# Patient Record
Sex: FEMALE | Race: BLACK OR AFRICAN AMERICAN | NOT HISPANIC OR LATINO | Employment: UNEMPLOYED | URBAN - METROPOLITAN AREA
[De-identification: names, ages, dates, MRNs, and addresses within clinical notes are randomized per-mention and may not be internally consistent; named-entity substitution may affect disease eponyms.]

---

## 2017-09-06 ENCOUNTER — HOSPITAL ENCOUNTER (EMERGENCY)
Facility: HOSPITAL | Age: 42
Discharge: HOME | End: 2017-09-06
Attending: EMERGENCY MEDICINE
Payer: MEDICAID

## 2017-09-06 VITALS
SYSTOLIC BLOOD PRESSURE: 145 MMHG | TEMPERATURE: 98 F | HEIGHT: 64 IN | OXYGEN SATURATION: 99 % | DIASTOLIC BLOOD PRESSURE: 83 MMHG | WEIGHT: 254 LBS | HEART RATE: 83 BPM | RESPIRATION RATE: 17 BRPM | BODY MASS INDEX: 43.36 KG/M2

## 2017-09-06 DIAGNOSIS — J06.9 UPPER RESPIRATORY TRACT INFECTION, UNSPECIFIED TYPE: Primary | ICD-10-CM

## 2017-09-06 DIAGNOSIS — I10 ESSENTIAL HYPERTENSION: ICD-10-CM

## 2017-09-06 PROCEDURE — 99283 EMERGENCY DEPT VISIT LOW MDM: CPT

## 2017-09-06 RX ORDER — CLONIDINE HYDROCHLORIDE 0.1 MG/1
0.2 TABLET ORAL 3 TIMES DAILY
Qty: 180 TABLET | Refills: 0 | Status: SHIPPED | OUTPATIENT
Start: 2017-09-06 | End: 2018-12-21 | Stop reason: CLARIF

## 2017-09-06 RX ORDER — BENZONATATE 100 MG/1
100 CAPSULE ORAL 3 TIMES DAILY PRN
Qty: 20 CAPSULE | Refills: 0 | Status: SHIPPED | OUTPATIENT
Start: 2017-09-06 | End: 2017-09-16

## 2017-09-07 NOTE — ED NOTES
Dr. Hay aware of pt's vital signs & states OK for discharge home at this time. Pt is stable, in NAD, RR equal & unlabored. Pt denies any further needs, questions, concerns or complaints. Will d/c per MD order.

## 2017-09-07 NOTE — ED NOTES
Armband checked, patient verified. Verified by patient spelling and stating the correct name on armband along with correct date of birth.

## 2017-09-07 NOTE — DISCHARGE INSTRUCTIONS
Rest  Drink plenty of clear fluids   Nasal saline spray to clear nasal drainage and help with nasal congestion  Zyrtec or Claritin to help dry mucus and post nasal drip  Mucinex or Mucinex DM for cough and chest congestion  Tylenol or Ibuprofen for fever, headache and body aches  Warm salt water gargles for throat comfort  Chloraseptic spray or lozenges for throat comfort  RTC with no improvement or worsening    Regarding UPPER RESPIRATORY ILLNESS, for treatment, I encouraged patient to: drink plenty of fluids; get lots of rest; take medications as prescribed; use over-the-counter medications for symptomatic treatment;  and use a humidifier or steam in the bathroom to improve patency of upper airway.  Patient instructed to notify primary care provider, or return to the emergency department, if the they: have a cough most days or have a cough that returns frequently; begin coughing up blood; develop a high fever or shaking chills; have a low-grade fever for three or more days; develop thick, greenish mucus, especially if it has a bad smell; and experience shortness of breath or chest pain. For prevention, discussed with patient the importance of refraining from smoking (if applicable), getting annual influenza vaccines, reducing exposure to air pollution, and the need to frequently wash hands to avoid spread of infection.     Regarding HYPERTENSION, I advised patient to: keep a record of blood pressure results; take your blood pressure medication exactly as directed without skipping doses; avoid medications that contain heart stimulants, including over-the-counter drugs such as decongestants; maintain a healthy weight; cut back on sodium intake (i.e., limit canned, dried, packaged, and fast foods and don?t add salt to food); follow the DASH (Dietary Approaches to Stop Hypertension) eating plan which recommends vegetables, fruits, whole gains, and other heart healthy foods; begin an exercise program that includes   aerobic exercise 3 to 4 times a week for an average of 40 minutes at a time (with approval of cardiologist or primary care provider); limit drinks that contain alcohol and caffeine; control levels of emotional stress; and seek emergency care for any shortness of breath, chest pain, difficulty speaking, confusion, or visual changes.

## 2017-09-07 NOTE — ED PROVIDER NOTES
"Encounter Date: 9/6/2017       History     Chief Complaint   Patient presents with    Nasal Congestion     with a productive cough, and "sinus pressure" since X3 days. "I need another script for my blood pressure medicine."     The history is provided by the patient.   URI   The primary symptoms include sore throat and cough. Primary symptoms do not include fever, fatigue, headaches, ear pain, swollen glands, wheezing, abdominal pain, nausea, vomiting, myalgias, arthralgias or rash. The current episode started several days ago (about 3 days). This is a new problem. The problem has been gradually worsening.   The sore throat began more than 2 days ago. The sore throat has been gradually worsening since its onset. The sore throat is not accompanied by trouble swallowing, drooling, hoarse voice or stridor. Sore Throat Pain Scale: mild.   The cough began 3 to 5 days ago. The cough is non-productive. There is nondescript sputum produced. It is exacerbated by smoking.   The onset of the illness is associated with exposure to sick contacts (pt states her grandchild has similar symptoms and she was taking care of grandchind for the past several days.). Symptoms associated with the illness include congestion. The illness is not associated with chills, plugged ear sensation, facial pain, sinus pressure or rhinorrhea. The following treatments were addressed: Acetaminophen was effective. A decongestant was not tried. Aspirin was not tried. NSAIDs were not tried. Risk factors: pt states her CD4 ct is wnl and viral load is undetectable.  pt is taking HIV meds regularly and has not missed any doses.     Also, pt states she is almost out of her BP meds and is requesting a refill.  Pt has apt with PCP next week.    Review of patient's allergies indicates:   Allergen Reactions    Morphine Itching     Past Medical History:   Diagnosis Date    HIV (human immunodeficiency virus infection)     Hypertension      Past Surgical History: "   Procedure Laterality Date     SECTION      FRACTURE SURGERY      HYSTERECTOMY       History reviewed. No pertinent family history.  Social History   Substance Use Topics    Smoking status: Current Every Day Smoker     Packs/day: 0.50    Smokeless tobacco: Not on file    Alcohol use No     Review of Systems   Constitutional: Negative for chills, fatigue and fever.   HENT: Positive for congestion and sore throat. Negative for drooling, ear pain, hoarse voice, rhinorrhea, sinus pressure and trouble swallowing.    Respiratory: Positive for cough. Negative for shortness of breath, wheezing and stridor.    Cardiovascular: Negative for chest pain.   Gastrointestinal: Negative for abdominal pain, nausea and vomiting.   Genitourinary: Negative for dysuria.   Musculoskeletal: Negative for arthralgias, back pain and myalgias.   Skin: Negative for rash.   Neurological: Negative for weakness and headaches.   Hematological: Does not bruise/bleed easily.   All other systems reviewed and are negative.      Physical Exam     Initial Vitals [17 1905]   BP Pulse Resp Temp SpO2   (!) 145/83 83 17 98.2 °F (36.8 °C) 99 %      MAP       103.67         Physical Exam    Nursing note and vitals reviewed.  Constitutional: Vital signs are normal. She appears well-developed and well-nourished.   HENT:   Head: Normocephalic and atraumatic.   Right Ear: Hearing, tympanic membrane, external ear and ear canal normal.   Left Ear: Hearing, tympanic membrane and external ear normal.   Nose: Mucosal edema present. Right sinus exhibits no maxillary sinus tenderness and no frontal sinus tenderness. Left sinus exhibits no maxillary sinus tenderness and no frontal sinus tenderness.   Mouth/Throat: Uvula is midline, oropharynx is clear and moist and mucous membranes are normal. No oropharyngeal exudate, posterior oropharyngeal edema or posterior oropharyngeal erythema.   Eyes: Conjunctivae and EOM are normal. Pupils are equal, round,  "and reactive to light.   Neck: Normal range of motion and full passive range of motion without pain. Neck supple. No thyromegaly present.   Cardiovascular: Normal rate, regular rhythm, normal heart sounds and intact distal pulses. Exam reveals no gallop and no friction rub.    No murmur heard.  Pulmonary/Chest: Breath sounds normal. No respiratory distress. She has no wheezes. She has no rhonchi. She exhibits no tenderness.   Abdominal: Soft. Bowel sounds are normal. She exhibits no distension. There is no tenderness. There is no rebound and no guarding.   Musculoskeletal: Normal range of motion. She exhibits no edema or tenderness.   Lymphadenopathy:     She has no cervical adenopathy.   Neurological: She is alert and oriented to person, place, and time. She has normal strength. No cranial nerve deficit or sensory deficit.   Skin: Skin is warm and dry. No rash noted.   Psychiatric: She has a normal mood and affect. Her behavior is normal. Judgment and thought content normal.         ED Course   Procedures  Labs Reviewed - No data to display       Vitals:    09/06/17 1905   BP: (!) 145/83   Pulse: 83   Resp: 17   Temp: 98.2 °F (36.8 °C)   TempSrc: Oral   SpO2: 99%   Weight: 115.2 kg (254 lb)   Height: 5' 4" (1.626 m)       No results found for this or any previous visit.      Imaging Results    None         Medications - No data to display    8:42 PM - Re-evaluation: The patient is resting comfortably and is in no acute distress. She states that her symptoms have improved after treatment within ER. Discussed shared treatment plan, specific conditions for return, and importance of follow up with patient and family.  She understands and agrees with the plan as discussed. Answered  her questions at this time. She has remained hemodynamically stable throughout the ED course and is appropriate for discharge home.     Regarding UPPER RESPIRATORY ILLNESS, for treatment, I encouraged patient to: drink plenty of fluids; get " lots of rest; take medications as prescribed; use over-the-counter medications for symptomatic treatment;  and use a humidifier or steam in the bathroom to improve patency of upper airway.  Patient instructed to notify primary care provider, or return to the emergency department, if the they: have a cough most days or have a cough that returns frequently; begin coughing up blood; develop a high fever or shaking chills; have a low-grade fever for three or more days; develop thick, greenish mucus, especially if it has a bad smell; and experience shortness of breath or chest pain. For prevention, discussed with patient the importance of refraining from smoking (if applicable), getting annual influenza vaccines, reducing exposure to air pollution, and the need to frequently wash hands to avoid spread of infection.     Regarding HYPERTENSION, I advised patient to: keep a record of blood pressure results; take your blood pressure medication exactly as directed without skipping doses; avoid medications that contain heart stimulants, including over-the-counter drugs such as decongestants; maintain a healthy weight; cut back on sodium intake (i.e., limit canned, dried, packaged, and fast foods and dont add salt to food); follow the DASH (Dietary Approaches to Stop Hypertension) eating plan which recommends vegetables, fruits, whole gains, and other heart healthy foods; begin an exercise program that includes  aerobic exercise 3 to 4 times a week for an average of 40 minutes at a time (with approval of cardiologist or primary care provider); limit drinks that contain alcohol and caffeine; control levels of emotional stress; and seek emergency care for any shortness of breath, chest pain, difficulty speaking, confusion, or visual changes.       Pre-hypertension/Hypertension: The pt has been informed that they may have pre-hypertension or hypertension based on a blood pressure reading in the ED. I recommend that the pt call  the PCP listed on their discharge instructions or a physician of their choice this week to arrange f/u for further evaluation of possible pre-hypertension or hypertension.     Yojana Durán was given a handout which discussed their disease process, precautions, and instructions for follow-up and therapy.    Follow-up Information     [facility]. Schedule an appointment as soon as possible for a visit in 1 week.    Why:  or your physician to reevaluate blood pressure and uri.  Contact information:  [address] [facility] [address] [phone]             [facility]. Schedule an appointment as soon as possible for a visit in 1 week.    Contact information:  [address]  [place] [address] [phone]             [facility].    Specialty:  Emergency Medicine  Why:  As needed, If symptoms worsen  Contact information:  [address] [address] [address] [phone]                 Discharge Medication List as of 9/6/2017  8:37 PM      START taking these medications    Details   benzonatate (TESSALON) 100 MG capsule Take 1 capsule (100 mg total) by mouth 3 (three) times daily as needed for Cough., Starting Wed 9/6/2017, Until Sat 9/16/2017, Print                ED Diagnosis  1. Upper respiratory tract infection, unspecified type    2. Essential hypertension                             ED Course      Clinical Impression:   The primary encounter diagnosis was Upper respiratory tract infection, unspecified type. A diagnosis of Essential hypertension was also pertinent to this visit.    Disposition:   Disposition: Discharged  Condition: Stable                        Ben Hay Jr., MD  09/07/17 0134

## 2018-12-21 ENCOUNTER — HOSPITAL ENCOUNTER (EMERGENCY)
Facility: HOSPITAL | Age: 43
Discharge: HOME | End: 2018-12-21
Attending: EMERGENCY MEDICINE
Payer: MEDICAID

## 2018-12-21 VITALS
HEIGHT: 64 IN | SYSTOLIC BLOOD PRESSURE: 126 MMHG | DIASTOLIC BLOOD PRESSURE: 75 MMHG | WEIGHT: 251.56 LBS | TEMPERATURE: 98 F | OXYGEN SATURATION: 98 % | HEART RATE: 82 BPM | RESPIRATION RATE: 16 BRPM | BODY MASS INDEX: 42.95 KG/M2

## 2018-12-21 DIAGNOSIS — K08.89 PAIN, DENTAL: Primary | ICD-10-CM

## 2018-12-21 DIAGNOSIS — R05.9 COUGH: ICD-10-CM

## 2018-12-21 DIAGNOSIS — Z72.0 TOBACCO USE: ICD-10-CM

## 2018-12-21 PROCEDURE — 99284 EMERGENCY DEPT VISIT MOD MDM: CPT

## 2018-12-21 RX ORDER — LORATADINE 10 MG/1
10 TABLET ORAL DAILY
COMMUNITY
Start: 2018-09-21 | End: 2019-11-06

## 2018-12-21 RX ORDER — CLONIDINE HYDROCHLORIDE 0.2 MG/1
0.2 TABLET ORAL 2 TIMES DAILY
COMMUNITY
Start: 2017-07-10 | End: 2019-01-08 | Stop reason: SDUPTHER

## 2018-12-21 RX ORDER — DOXYCYCLINE 100 MG/1
100 CAPSULE ORAL 2 TIMES DAILY
Qty: 20 CAPSULE | Refills: 0 | Status: SHIPPED | OUTPATIENT
Start: 2018-12-21 | End: 2018-12-31

## 2018-12-21 RX ORDER — FLUTICASONE PROPIONATE 50 MCG
1 SPRAY, SUSPENSION (ML) NASAL DAILY
COMMUNITY
Start: 2018-09-21 | End: 2019-09-21

## 2018-12-21 RX ORDER — NAPROXEN 500 MG/1
500 TABLET ORAL 2 TIMES DAILY WITH MEALS
Qty: 12 TABLET | Refills: 0 | OUTPATIENT
Start: 2018-12-21 | End: 2019-11-06

## 2018-12-21 RX ORDER — ALBUTEROL SULFATE 90 UG/1
2 AEROSOL, METERED RESPIRATORY (INHALATION) EVERY 6 HOURS PRN
COMMUNITY
Start: 2018-02-16 | End: 2019-11-28 | Stop reason: SDUPTHER

## 2018-12-22 NOTE — ED PROVIDER NOTES
History      Chief Complaint   Patient presents with    Dental Pain     Onset two days ago. L side.        Review of patient's allergies indicates:   Allergen Reactions    Morphine Itching        HPI   HPI    2018, 6:58 PM   History obtained from the patient      History of Present Illness: Yojana Durán is a 43 y.o. female patient who presents to the Emergency Department for left dental pain for 2-3 days. Also cough for 2-3 weeks.  Daily smoker.  Denies f/n/v.  Symptoms are constant and moderate in severity. No further complaints or concerns at this time.           PCP: Primary Doctor No       Past Medical History:  Past Medical History:   Diagnosis Date    HIV (human immunodeficiency virus infection)     Hypertension          Past Surgical History:  Past Surgical History:   Procedure Laterality Date    ANKLE FRACTURE SURGERY Left      SECTION      FRACTURE SURGERY      HYSTERECTOMY             Family History:  History reviewed. No pertinent family history.        Social History:  Social History     Tobacco Use    Smoking status: Current Every Day Smoker     Packs/day: 0.50    Smokeless tobacco: Never Used   Substance and Sexual Activity    Alcohol use: No    Drug use: No    Sexual activity: Not on file       ROS   Constitutional: Negative for chills and fever.   HENT: Positive for dental problem. Negative for sore throat.    Respiratory: Negative for chest tightness and shortness of breath.    Cardiovascular: Negative for chest pain.   Gastrointestinal: Negative for nausea and vomiting.   Genitourinary: Negative for dysuria.   Musculoskeletal: Negative for back pain.   Skin: Negative for pallor and rash.   Neurological: Negative for weakness.   Hematological: Does not bruise/bleed easily.   All other systems reviewed and are negative.  Review of Systems    Physical Exam      Initial Vitals [18 1844]   BP Pulse Resp Temp SpO2   126/75 82 16 98.4 °F (36.9 °C) 98 %      MAP    "    --         Physical Exam  Vital signs and nursing notes reviewed.  Constitutional: Patient is in NAD. Awake and alert. Well-developed and well-nourished.  Head: Atraumatic. Normocephalic.  Eyes: PERRL. EOM intact. Conjunctivae nl. No scleral icterus.  ENT: Mucous membranes are moist. Oropharynx is clear.  Left lower gingival ttp, mild erythema.  +dental caries.  No facial edema  Neck: Supple. No JVD. No lymphadenopathy.  No meningismus  Cardiovascular: Regular rate and rhythm. No murmurs, rubs, or gallops. Distal pulses are 2+ and symmetric.  Pulmonary/Chest: No respiratory distress. Clear to auscultation bilaterally. No wheezing, rales, or rhonchi.  Abdominal: Soft. Non-distended. No TTP. No rebound, guarding, or rigidity. Good bowel sounds.  Genitourinary: No CVA tenderness  Musculoskeletal: Moves all extremities. No edema.   Skin: Warm and dry.  Neurological: Awake and alert. No acute focal neurological deficits are appreciated.  Psychiatric: Normal affect. Good eye contact. Appropriate in content.      ED Course      Procedures  ED Vital Signs:  Vitals:    12/21/18 1844   BP: 126/75   Pulse: 82   Resp: 16   Temp: 98.4 °F (36.9 °C)   TempSrc: Oral   SpO2: 98%   Weight: 114.1 kg (251 lb 8.7 oz)   Height: 5' 4" (1.626 m)                 Imaging Results:  Imaging Results    None            The Emergency Provider reviewed the vital signs and test results, which are outlined above.    ED Discussion       Will rx doxy to cover dental and smokers bronchitis.  Pt already has albuterol inhaler.      All findings were reviewed with the patient/family in detail.  Findings seem to be most consistent with a diagnosis of dental pain and dental caries.  GAVE PT LIST OF DENTAL RESOURCES.  DISCUSSED POTENTIAL COMPLICATIONS IF PT DOES NOT FOLLOW UP WITH DENTIST.  All remaining questions and concerns were addressed at that time.  Patient/family has been counseled regarding the need for follow-up as well as the indication to " return to the emergency room should new or worrisome developments occur.        Medication(s) given in the ER:  Medications - No data to display        Follow-up Information     [facility] [place] In 2 days.    Contact information:  [address] [facility]  [place] [id number] [phone]                      Medication List      START taking these medications    doxycycline 100 MG Cap  Commonly known as:  VIBRAMYCIN  Take 1 capsule (100 mg total) by mouth 2 (two) times daily. for 10 days     naproxen 500 MG tablet  Commonly known as:  NAPROSYN  Take 1 tablet (500 mg total) by mouth 2 (two) times daily with meals. Prn pain        ASK your doctor about these medications    albuterol 90 mcg/actuation inhaler  Commonly known as:  PROVENTIL/VENTOLIN HFA     CLARITIN 10 mg tablet  Generic drug:  loratadine     cloNIDine 0.2 MG tablet  Commonly known as:  CATAPRES     fluticasone 50 mcg/actuation nasal spray  Commonly known as:  FLONASE           Where to Get Your Medications      You can get these medications from any pharmacy    Bring a paper prescription for each of these medications  · doxycycline 100 MG Cap  · naproxen 500 MG tablet           This SmartLink is deprecated. Use AVBebestoreEDLIST instead to display the medication list for a patient.       Medical Decision Making              MDM               Clinical Impression:        ICD-10-CM ICD-9-CM   1. Pain, dental K08.89 525.9   2. Cough R05 786.2   3. Tobacco use Z72.0 305.1              Disposition  Stable  Discharged     Jenise Karimi PA-C  12/21/18 1954

## 2019-01-08 ENCOUNTER — HOSPITAL ENCOUNTER (EMERGENCY)
Facility: HOSPITAL | Age: 44
Discharge: HOME | End: 2019-01-08
Attending: EMERGENCY MEDICINE
Payer: MEDICAID

## 2019-01-08 VITALS
OXYGEN SATURATION: 99 % | TEMPERATURE: 99 F | RESPIRATION RATE: 18 BRPM | HEART RATE: 70 BPM | WEIGHT: 258.38 LBS | SYSTOLIC BLOOD PRESSURE: 145 MMHG | HEIGHT: 64 IN | BODY MASS INDEX: 44.11 KG/M2 | DIASTOLIC BLOOD PRESSURE: 89 MMHG

## 2019-01-08 DIAGNOSIS — B20 HIV (HUMAN IMMUNODEFICIENCY VIRUS INFECTION): ICD-10-CM

## 2019-01-08 DIAGNOSIS — J01.10 ACUTE FRONTAL SINUSITIS, RECURRENCE NOT SPECIFIED: Primary | ICD-10-CM

## 2019-01-08 PROCEDURE — 99284 EMERGENCY DEPT VISIT MOD MDM: CPT | Mod: ER

## 2019-01-08 RX ORDER — CLONIDINE HYDROCHLORIDE 0.2 MG/1
0.2 TABLET ORAL 2 TIMES DAILY
Qty: 60 TABLET | Refills: 0 | Status: SHIPPED | OUTPATIENT
Start: 2019-01-08 | End: 2019-11-06 | Stop reason: SDUPTHER

## 2019-01-08 RX ORDER — LAMIVUDINE 100 MG/1
100 TABLET, FILM COATED ORAL
COMMUNITY
Start: 2018-12-30 | End: 2019-01-13

## 2019-01-08 RX ORDER — PROMETHAZINE HYDROCHLORIDE 6.25 MG/5ML
6.25 SYRUP ORAL EVERY 6 HOURS PRN
Qty: 100 ML | Refills: 0 | Status: SHIPPED | OUTPATIENT
Start: 2019-01-08 | End: 2019-11-06

## 2019-01-08 RX ORDER — AMOXICILLIN AND CLAVULANATE POTASSIUM 875; 125 MG/1; MG/1
1 TABLET, FILM COATED ORAL 2 TIMES DAILY
Qty: 14 TABLET | Refills: 0 | Status: SHIPPED | OUTPATIENT
Start: 2019-01-08 | End: 2019-11-06

## 2019-01-11 NOTE — ED PROVIDER NOTES
Encounter Date: 2019       History     Chief Complaint   Patient presents with    Sinusitis     forehead pressure, nasal drip, onset 2 weeks     Patient currently presents with a chief complaint of sinus congestion.  Symptoms began about 14 days ago and has continued to progress despite use of antihistamines and there are now associated signs of sinus pressure in the forehead.  Patient denies ongoing fever.  PMH notable for a history of HIV.  Patient reports compliance with antiviral therapy.          Review of patient's allergies indicates:   Allergen Reactions    Morphine Itching     Past Medical History:   Diagnosis Date    HIV (human immunodeficiency virus infection)     Hypertension      Past Surgical History:   Procedure Laterality Date    ANKLE FRACTURE SURGERY Left      SECTION      FRACTURE SURGERY      HYSTERECTOMY       History reviewed. No pertinent family history.  Social History     Tobacco Use    Smoking status: Current Every Day Smoker     Packs/day: 0.50    Smokeless tobacco: Never Used   Substance Use Topics    Alcohol use: No    Drug use: No     Review of Systems   Constitutional: Negative for chills and fever.   HENT: Positive for congestion, postnasal drip, rhinorrhea and sinus pressure.    Respiratory: Negative for cough, chest tightness, shortness of breath and wheezing.    Cardiovascular: Negative for chest pain, palpitations and leg swelling.   Gastrointestinal: Negative for abdominal pain, constipation, diarrhea, nausea and vomiting.   Genitourinary: Negative for dysuria, frequency, urgency, vaginal bleeding and vaginal discharge.   Skin: Negative for color change and rash.   Allergic/Immunologic: Negative for immunocompromised state.   Neurological: Negative for dizziness, weakness and numbness.   Hematological: Negative for adenopathy. Does not bruise/bleed easily.   All other systems reviewed and are negative.      Physical Exam     Initial Vitals [19 1758]    BP Pulse Resp Temp SpO2   (!) 150/92 69 16 99 °F (37.2 °C) 99 %      MAP       --         Physical Exam    Nursing note and vitals reviewed.  Constitutional: She appears well-developed and well-nourished. She is not diaphoretic. No distress.   HENT:   Head: Normocephalic and atraumatic.   Right Ear: External ear normal.   Left Ear: External ear normal.   Nose: Mucosal edema and rhinorrhea present. Right sinus exhibits frontal sinus tenderness. Left sinus exhibits frontal sinus tenderness.   Mouth/Throat: Oropharynx is clear and moist.   Eyes: Conjunctivae and EOM are normal. Pupils are equal, round, and reactive to light. No scleral icterus.   Neck: Neck supple. No tracheal deviation present. No JVD present.   Cardiovascular: Normal rate, regular rhythm, normal heart sounds and intact distal pulses. Exam reveals no gallop and no friction rub.    No murmur heard.  Pulmonary/Chest: Breath sounds normal. No respiratory distress. She has no wheezes. She has no rhonchi. She has no rales.   Abdominal: Soft. Bowel sounds are normal. She exhibits no distension. There is no tenderness.   Musculoskeletal: Normal range of motion. She exhibits no edema.   Neurological: She is alert and oriented to person, place, and time. She has normal strength. No cranial nerve deficit.   Skin: Skin is warm and dry. No rash noted.   Psychiatric: She has a normal mood and affect. Her behavior is normal.         ED Course   Procedures  Labs Reviewed - No data to display       Imaging Results    None          Medical Decision Making:   ED Management:  All findings were reviewed with the patient/family in detail along with the diagnosis of acute sinusitis.  We will initiate ABX given the progression and extended time frame especially given the history of HIV.  I see no indication of an emergent process beyond that addressed during our encounter but have duly counseled the patient/family regarding the need for prompt follow-up as well as the  indications that should prompt immediate return to the emergency room should new or worrisome developments occur.  The patient/family communicates understanding of all this information and all remaining questions and concerns were addressed at this time.                          Clinical Impression:   The primary encounter diagnosis was Acute frontal sinusitis, recurrence not specified. A diagnosis of HIV (human immunodeficiency virus infection) was also pertinent to this visit.                             Pierre Tovar MD  01/10/19 9680

## 2019-11-06 ENCOUNTER — HOSPITAL ENCOUNTER (EMERGENCY)
Facility: HOSPITAL | Age: 44
Discharge: HOME | End: 2019-11-06
Attending: EMERGENCY MEDICINE
Payer: MEDICAID

## 2019-11-06 VITALS
DIASTOLIC BLOOD PRESSURE: 105 MMHG | OXYGEN SATURATION: 100 % | HEART RATE: 72 BPM | SYSTOLIC BLOOD PRESSURE: 185 MMHG | HEIGHT: 64 IN | RESPIRATION RATE: 20 BRPM | TEMPERATURE: 98 F | WEIGHT: 261.25 LBS | BODY MASS INDEX: 44.6 KG/M2

## 2019-11-06 DIAGNOSIS — R51.9 NONINTRACTABLE HEADACHE, UNSPECIFIED CHRONICITY PATTERN, UNSPECIFIED HEADACHE TYPE: ICD-10-CM

## 2019-11-06 DIAGNOSIS — J06.9 VIRAL URI WITH COUGH: Primary | ICD-10-CM

## 2019-11-06 DIAGNOSIS — I10 HYPERTENSION, UNSPECIFIED TYPE: ICD-10-CM

## 2019-11-06 LAB — POCT GLUCOSE: 73 MG/DL (ref 70–110)

## 2019-11-06 PROCEDURE — 99283 EMERGENCY DEPT VISIT LOW MDM: CPT | Mod: ER

## 2019-11-06 PROCEDURE — 25000003 PHARM REV CODE 250: Mod: ER | Performed by: PHYSICIAN ASSISTANT

## 2019-11-06 RX ORDER — ERGOCALCIFEROL 1.25 MG/1
50000 CAPSULE ORAL
COMMUNITY

## 2019-11-06 RX ORDER — ACETAMINOPHEN 500 MG
TABLET ORAL
COMMUNITY

## 2019-11-06 RX ORDER — PEN NEEDLE, DIABETIC 30 GX3/16"
NEEDLE, DISPOSABLE MISCELLANEOUS
COMMUNITY
Start: 2019-07-07

## 2019-11-06 RX ORDER — LISINOPRIL AND HYDROCHLOROTHIAZIDE 20; 25 MG/1; MG/1
TABLET ORAL
COMMUNITY

## 2019-11-06 RX ORDER — CLONIDINE HYDROCHLORIDE 0.2 MG/1
0.2 TABLET ORAL 2 TIMES DAILY
Qty: 60 TABLET | Refills: 0 | Status: SHIPPED | OUTPATIENT
Start: 2019-11-06 | End: 2019-11-28 | Stop reason: SDUPTHER

## 2019-11-06 RX ORDER — GLIMEPIRIDE 1 MG/1
TABLET ORAL
COMMUNITY
Start: 2019-07-22

## 2019-11-06 RX ORDER — INSULIN GLARGINE 100 [IU]/ML
40 INJECTION, SOLUTION SUBCUTANEOUS NIGHTLY
COMMUNITY

## 2019-11-06 RX ORDER — LANCETS 33 GAUGE
EACH MISCELLANEOUS
COMMUNITY

## 2019-11-06 RX ORDER — BUTALBITAL, ACETAMINOPHEN AND CAFFEINE 50; 325; 40 MG/1; MG/1; MG/1
2 TABLET ORAL
Status: COMPLETED | OUTPATIENT
Start: 2019-11-06 | End: 2019-11-06

## 2019-11-06 RX ORDER — FLUTICASONE PROPIONATE 50 MCG
1 SPRAY, SUSPENSION (ML) NASAL
COMMUNITY
Start: 2018-12-30 | End: 2019-11-28 | Stop reason: SDUPTHER

## 2019-11-06 RX ORDER — OXYMETAZOLINE HCL 0.05 %
SPRAY, NON-AEROSOL (ML) NASAL
COMMUNITY

## 2019-11-06 RX ORDER — CETIRIZINE HYDROCHLORIDE 10 MG/1
TABLET ORAL
COMMUNITY
End: 2022-02-12 | Stop reason: SDUPTHER

## 2019-11-06 RX ORDER — CLONIDINE HYDROCHLORIDE 0.2 MG/1
0.2 TABLET ORAL
Status: COMPLETED | OUTPATIENT
Start: 2019-11-06 | End: 2019-11-06

## 2019-11-06 RX ADMIN — BUTALBITAL, ACETAMINOPHEN AND CAFFEINE 2 TABLET: 50; 325; 40 TABLET ORAL at 06:11

## 2019-11-06 RX ADMIN — CLONIDINE HYDROCHLORIDE 0.2 MG: 0.2 TABLET ORAL at 06:11

## 2019-11-07 NOTE — ED PROVIDER NOTES
History      Chief Complaint   Patient presents with    Cough     cough for 1 week greenish to clear secretions with headache       Review of patient's allergies indicates:   Allergen Reactions    Morphine Itching        HPI   HPI    2019, 8:15 PM   History obtained from the patient      History of Present Illness: Yojana Durán is a 43 y.o. female patient who presents to the Emergency Department for headache for 2 days.  Also nasal congestion Onset was gradual.  Denies injury, fever, neck stiffness, vision change. Symptoms are moderate in severity.     No further complaints or concerns at this time.     Blood pressure is elevated.  Pt denies cp, sob, dizziness, vision change.  She ran out of clonidine 3 days ago        PCP: Primary Doctor No       Past Medical History:  Past Medical History:   Diagnosis Date    HIV (human immunodeficiency virus infection)     Hypertension          Past Surgical History:  Past Surgical History:   Procedure Laterality Date    ANKLE FRACTURE SURGERY Left      SECTION      FRACTURE SURGERY      HYSTERECTOMY             Family History:  History reviewed. No pertinent family history.        Social History:  Social History     Tobacco Use    Smoking status: Current Every Day Smoker     Packs/day: 0.50    Smokeless tobacco: Never Used   Substance and Sexual Activity    Alcohol use: No    Drug use: No    Sexual activity: Not on file       ROS   Review of Systems   Constitutional: Negative for chills and fever.   HENT: Negative for ear pain, facial swelling and sore throat.    Eyes: Negative for pain and visual disturbance.   Respiratory: Negative for shortness of breath.    Cardiovascular: Negative for chest pain.   Gastrointestinal: Negative for abdominal pain.   Genitourinary: Negative for dysuria.   Musculoskeletal: Negative for back pain and neck stiffness.   Skin: Negative for rash and wound.   Neurological: Positive for headaches. Negative for  "seizures, syncope, facial asymmetry, speech difficulty, weakness, light-headedness and numbness.   Hematological: Does not bruise/bleed easily.   All other systems reviewed and are negative.    Review of Systems    Physical Exam      Initial Vitals [11/06/19 1751]   BP Pulse Resp Temp SpO2   (!) 185/105 72 20 98.1 °F (36.7 °C) 100 %      MAP       --         Physical Exam  Vital signs and nursing notes reviewed.  Constitutional: Patient is in NAD. Awake and alert. Well-developed and well-nourished.  Head: Atraumatic. Normocephalic.  Eyes: PERRL. EOM intact. Conjunctivae nl. No scleral icterus.  ENT: Mucous membranes are moist. Oropharynx is clear.  TM's normal bilaterally.  No mastoid tenderness  Neck: Supple. No JVD. No lymphadenopathy.  No meningismus  Cardiovascular: Regular rate and rhythm. No murmurs, rubs, or gallops. Distal pulses are 2+ and symmetric.  Pulmonary/Chest: No respiratory distress. Clear to auscultation bilaterally. No wheezing, rales, or rhonchi.  Abdominal: Soft. Non-distended. No TTP. No rebound, guarding, or rigidity. Good bowel sounds.  Genitourinary: No CVA tenderness  Musculoskeletal: Moves all extremities. No edema.   Skin: Warm and dry.  Neurological: Awake and alert. No acute focal neurological deficits are appreciated.  No facial droop.  Tongue is midline.  No pronator drift.  Finger to nose normal.  Hand  equal and strong, 5/5 motor strength x 4.    Psychiatric: Normal affect. Good eye contact. Appropriate in content.      ED Course          Procedures  ED Vital Signs:  Vitals:    11/06/19 1751   BP: (!) 185/105   Pulse: 72   Resp: 20   Temp: 98.1 °F (36.7 °C)   TempSrc: Oral   SpO2: 100%   Weight: 118.5 kg (261 lb 3.9 oz)   Height: 5' 4" (1.626 m)         Results for orders placed or performed during the hospital encounter of 11/06/19   POCT glucose   Result Value Ref Range    POCT Glucose 73 70 - 110 mg/dL             Imaging Results:  Imaging Results    None            The " Emergency Provider reviewed the vital signs and test results, which are outlined above.    ED Discussion             Medication(s) given in the ER:  Medications   cloNIDine tablet 0.2 mg (0.2 mg Oral Given 11/6/19 1839)   butalbital-acetaminophen-caffeine -40 mg per tablet 2 tablet (2 tablets Oral Given 11/6/19 1840)           Follow-up Information     [facility].    Specialty:  Emergency Medicine  Why:  If symptoms worsen  Contact information:  [address]  [place] [address] [phone]           Care [facility] [place] In 2 days.    Contact information:  [address] [address] [address]  [place] [address] [phone]                          Medication List      CONTINUE taking these medications    cloNIDine 0.2 MG tablet  Commonly known as:  CATAPRES  Take 1 tablet (0.2 mg total) by mouth 2 (two) times daily.        STOP taking these medications    amoxicillin-clavulanate 875-125mg 875-125 mg per tablet  Commonly known as:  AUGMENTIN     naproxen 500 MG tablet  Commonly known as:  NAPROSYN     promethazine 6.25 mg/5 mL syrup  Commonly known as:  PHENERGAN        ASK your doctor about these medications    acetaminophen 500 MG tablet  Commonly known as:  TYLENOL     albuterol 90 mcg/actuation inhaler  Commonly known as:  PROVENTIL/VENTOLIN HFA     BIKTARVY -25 mg per tablet  Generic drug:  fshnkkhkr-hhfgiaqu-xmesvae ala     blood sugar diagnostic Strp     cetirizine 10 MG tablet  Commonly known as:  ZYRTEC     CLARITIN 10 mg tablet  Generic drug:  loratadine     fluticasone propionate 50 mcg/actuation nasal spray  Commonly known as:  FLONASE     glimepiride 1 MG tablet  Commonly known as:  AMARYL     LANTUS SOLOSTAR U-100 INSULIN glargine 100 units/mL (3mL) SubQ pen  Generic drug:  insulin     lisinopril-hydrochlorothiazide 20-25 mg Tab  Commonly known as:  PRINZIDE,ZESTORETIC     NASAL SPRAY (OXYMETAZOLINE) 0.05 % nasal spray  Generic drug:  oxymetazoline     ONETOUCH  "DELICA LANCETS 33 gauge Misc  Generic drug:  lancets     ONETOUCH VERIO SYSTEM MISC     pen needle, diabetic 31 gauge x 3/16" Ndle     VITAMIN D2 50,000 unit Cap  Generic drug:  ergocalciferol           Where to Get Your Medications      You can get these medications from any pharmacy    Bring a paper prescription for each of these medications  · cloNIDine 0.2 MG tablet             Medical Decision Making        All findings were reviewed with the patient/family in detail.   All remaining questions and concerns were addressed at that time.  Patient/family has been counseled regarding the need for follow-up as well as the indication to return to the emergency room should new or worrisome developments occur.        MDM               Clinical Impression:        ICD-10-CM ICD-9-CM   1. Viral URI with cough J06.9 465.9    B97.89    2. Nonintractable headache, unspecified chronicity pattern, unspecified headache type R51 784.0   3. Hypertension, unspecified type I10 401.9               Jenise Karimi PA-C  11/06/19 2039    "

## 2019-11-07 NOTE — ED NOTES
Aaox3, skin warm and dry, resp unlabored and even. amb with steady gait and fitzpatrick well. C/o cough for 1 week with headache.

## 2019-11-28 ENCOUNTER — HOSPITAL ENCOUNTER (EMERGENCY)
Facility: HOSPITAL | Age: 44
Discharge: HOME | End: 2019-11-28
Attending: EMERGENCY MEDICINE
Payer: MEDICAID

## 2019-11-28 VITALS
SYSTOLIC BLOOD PRESSURE: 128 MMHG | BODY MASS INDEX: 45.11 KG/M2 | TEMPERATURE: 98 F | DIASTOLIC BLOOD PRESSURE: 78 MMHG | RESPIRATION RATE: 20 BRPM | WEIGHT: 262.81 LBS | HEART RATE: 86 BPM | OXYGEN SATURATION: 98 %

## 2019-11-28 DIAGNOSIS — J02.9 SORE THROAT: ICD-10-CM

## 2019-11-28 DIAGNOSIS — Z76.0 MEDICATION REFILL: ICD-10-CM

## 2019-11-28 DIAGNOSIS — J06.9 VIRAL URI WITH COUGH: Primary | ICD-10-CM

## 2019-11-28 DIAGNOSIS — H92.01 EAR PAIN, RIGHT: ICD-10-CM

## 2019-11-28 PROCEDURE — 99284 EMERGENCY DEPT VISIT MOD MDM: CPT | Mod: ER

## 2019-11-28 RX ORDER — FLUTICASONE PROPIONATE 50 MCG
1 SPRAY, SUSPENSION (ML) NASAL DAILY
Qty: 1 BOTTLE | Refills: 0 | Status: SHIPPED | OUTPATIENT
Start: 2019-11-28 | End: 2020-11-27

## 2019-11-28 RX ORDER — BENZONATATE 100 MG/1
100 CAPSULE ORAL 3 TIMES DAILY PRN
Qty: 20 CAPSULE | Refills: 0 | Status: SHIPPED | OUTPATIENT
Start: 2019-11-28 | End: 2019-12-08

## 2019-11-28 RX ORDER — CLONIDINE HYDROCHLORIDE 0.2 MG/1
0.2 TABLET ORAL 2 TIMES DAILY
Qty: 60 TABLET | Refills: 0 | Status: SHIPPED | OUTPATIENT
Start: 2019-11-28 | End: 2020-10-13 | Stop reason: SDUPTHER

## 2019-11-28 RX ORDER — ALBUTEROL SULFATE 90 UG/1
1-2 AEROSOL, METERED RESPIRATORY (INHALATION) EVERY 6 HOURS PRN
Qty: 18 G | Refills: 0 | Status: SHIPPED | OUTPATIENT
Start: 2019-11-28

## 2020-10-13 ENCOUNTER — HOSPITAL ENCOUNTER (EMERGENCY)
Facility: HOSPITAL | Age: 45
Discharge: HOME | End: 2020-10-13
Attending: EMERGENCY MEDICINE
Payer: MEDICAID

## 2020-10-13 VITALS
HEART RATE: 101 BPM | SYSTOLIC BLOOD PRESSURE: 192 MMHG | RESPIRATION RATE: 16 BRPM | WEIGHT: 257.94 LBS | DIASTOLIC BLOOD PRESSURE: 86 MMHG | HEIGHT: 64 IN | OXYGEN SATURATION: 100 % | TEMPERATURE: 99 F | BODY MASS INDEX: 44.04 KG/M2

## 2020-10-13 DIAGNOSIS — R51.9 NONINTRACTABLE HEADACHE, UNSPECIFIED CHRONICITY PATTERN, UNSPECIFIED HEADACHE TYPE: Primary | ICD-10-CM

## 2020-10-13 DIAGNOSIS — Z91.148 NONCOMPLIANCE WITH MEDICATION REGIMEN: ICD-10-CM

## 2020-10-13 DIAGNOSIS — I10 HYPERTENSION, UNSPECIFIED TYPE: ICD-10-CM

## 2020-10-13 LAB
BILIRUB UR QL STRIP: NEGATIVE
CLARITY UR REFRACT.AUTO: CLEAR
COLOR UR AUTO: YELLOW
GLUCOSE UR QL STRIP: NEGATIVE
HGB UR QL STRIP: NEGATIVE
KETONES UR QL STRIP: NEGATIVE
LEUKOCYTE ESTERASE UR QL STRIP: NEGATIVE
NITRITE UR QL STRIP: NEGATIVE
PH UR STRIP: 6 [PH] (ref 5–8)
PROT UR QL STRIP: NEGATIVE
SP GR UR STRIP: >=1.03 (ref 1–1.03)
URN SPEC COLLECT METH UR: ABNORMAL
UROBILINOGEN UR STRIP-ACNC: NEGATIVE EU/DL

## 2020-10-13 PROCEDURE — 99284 EMERGENCY DEPT VISIT MOD MDM: CPT | Mod: ER

## 2020-10-13 PROCEDURE — 81003 URINALYSIS AUTO W/O SCOPE: CPT | Mod: ER

## 2020-10-13 PROCEDURE — 86803 HEPATITIS C AB TEST: CPT

## 2020-10-13 PROCEDURE — 25000003 PHARM REV CODE 250: Mod: ER | Performed by: EMERGENCY MEDICINE

## 2020-10-13 RX ORDER — ACETAMINOPHEN 500 MG
1000 TABLET ORAL
Status: COMPLETED | OUTPATIENT
Start: 2020-10-13 | End: 2020-10-13

## 2020-10-13 RX ORDER — NAPROXEN 500 MG/1
500 TABLET ORAL 2 TIMES DAILY WITH MEALS
Qty: 20 TABLET | Refills: 0 | Status: SHIPPED | OUTPATIENT
Start: 2020-10-13

## 2020-10-13 RX ORDER — CLONIDINE HYDROCHLORIDE 0.2 MG/1
0.2 TABLET ORAL 2 TIMES DAILY
Qty: 60 TABLET | Refills: 0 | OUTPATIENT
Start: 2020-10-13 | End: 2021-05-30

## 2020-10-13 RX ORDER — CLONIDINE HYDROCHLORIDE 0.2 MG/1
0.2 TABLET ORAL
Status: COMPLETED | OUTPATIENT
Start: 2020-10-13 | End: 2020-10-13

## 2020-10-13 RX ADMIN — ACETAMINOPHEN 1000 MG: 500 TABLET ORAL at 04:10

## 2020-10-13 RX ADMIN — CLONIDINE HYDROCHLORIDE 0.2 MG: 0.2 TABLET ORAL at 04:10

## 2020-10-13 NOTE — DISCHARGE INSTRUCTIONS
Patient's headache is consistent with previous headaches and lacks features concerning for emergent or life threatening condition.  I do not suspect SAH, meningitis, increased IC pressure, infectious, toxic, vascular, CNS, or other EMC.  I have discussed this at length with patient.  Regarding treatment, advised patient to take nonsteroidal antiinflammatory medications, acetaminophen, or any medications prescribed as instructed.  To prevent headaches, patient advised to avoid muscle tension (do not stay in one position for long periods of time), avoid eye strain (make sure there is adequate lighting for reading and routine tasks), eat healthy foods, exercise, and do not smoke or drink excessive alcohol.  Patient also advised to avoid overuse of over-the-counter or prescription medications. Patient was instructed to contact primary healthcare provider if: headaches continue to get worse; occur often enough that they affect daily work or normal activities; frequent medication use is needed to manage headaches; headaches that worsen and cause vomiting; or there are any questions or concerns about the condition or care. Advised patient to return to the emergency department or call 911 if they develop a sudden headache that presents suddenly and much worse than usual headaches; have difficulty seeing, speaking, or moving; become confused or have seizure activity; or develop a fever and stiff neck with the headache.     Regarding HYPERTENSION, I advised patient to: keep a record of blood pressure results; take your blood pressure medication exactly as directed without skipping doses; avoid medications that contain heart stimulants, including over-the-counter drugs such as decongestants; maintain a healthy weight; cut back on sodium intake (i.e., limit canned, dried, packaged, and fast foods and dont add salt to food); follow the DASH (Dietary Approaches to Stop Hypertension) eating plan which recommends vegetables,  fruits, whole gains, and other heart healthy foods; begin an exercise program that includes  aerobic exercise 3 to 4 times a week for an average of 40 minutes at a time (with approval of cardiologist or primary care provider); limit drinks that contain alcohol and caffeine; control levels of emotional stress; and seek emergency care for any shortness of breath, chest pain, difficulty speaking, confusion, or visual changes.

## 2020-10-13 NOTE — ED PROVIDER NOTES
Encounter Date: 10/13/2020       History     Chief Complaint   Patient presents with    Headache     Headache x 3 days. PT out of clonidine x 3 days.     The history is provided by the patient.   Headache   This is a recurrent problem. The current episode started in the past 7 days (x 3 days. pt states she gets these headaches when she doesn't take her htn meds. states she has been out for 3 days). The problem occurs intermittently. The problem has been unchanged. The pain is located in the frontal region. The pain quality is similar to prior headaches. The quality of the pain is described as aching. Pain scale: mild. Associated symptoms include phonophobia and photophobia. Pertinent negatives include no abdominal pain, abnormal behavior, anorexia, back pain, blurred vision, coughing, dizziness, drainage, ear pain, eye pain, eye redness, eye watering, facial sweating, fever, hearing loss, insomnia, loss of balance, muscle aches, nausea, neck pain, numbness, rhinorrhea, scalp tenderness, seizures, sinus pressure, sore throat, swollen glands, tingling, tinnitus, visual change, vomiting, weakness or weight loss. Nothing aggravates the symptoms. She has tried acetaminophen (6 hours ago) for the symptoms. The treatment provided mild relief. Her past medical history is significant for hypertension and obesity. There is no history of cancer, cluster headaches, immunosuppression, migraine headaches, migraines in the family, pseudotumor cerebri, recent head traumas, sinus disease or TMJ.     Pt states she has been out of her bp meds for several days/weeks.    Review of patient's allergies indicates:   Allergen Reactions    Morphine Itching     Past Medical History:   Diagnosis Date    HIV (human immunodeficiency virus infection)     Hypertension      Past Surgical History:   Procedure Laterality Date    ANKLE FRACTURE SURGERY Left      SECTION      FRACTURE SURGERY      HYSTERECTOMY       History reviewed. No  pertinent family history.  Social History     Tobacco Use    Smoking status: Current Every Day Smoker     Packs/day: 0.50    Smokeless tobacco: Never Used   Substance Use Topics    Alcohol use: No    Drug use: No     Review of Systems   Constitutional: Negative for fever and weight loss.   HENT: Negative for ear pain, hearing loss, rhinorrhea, sinus pressure, sore throat and tinnitus.    Eyes: Positive for photophobia. Negative for blurred vision, pain and redness.   Respiratory: Negative for cough and shortness of breath.    Cardiovascular: Negative for chest pain.   Gastrointestinal: Negative for abdominal pain, anorexia, nausea and vomiting.   Genitourinary: Negative for dysuria.   Musculoskeletal: Negative for back pain and neck pain.   Skin: Negative for rash.   Neurological: Positive for headaches. Negative for dizziness, tingling, seizures, weakness, numbness and loss of balance.   Hematological: Does not bruise/bleed easily.   Psychiatric/Behavioral: The patient does not have insomnia.    All other systems reviewed and are negative.      Physical Exam     Initial Vitals [10/13/20 1620]   BP Pulse Resp Temp SpO2   (!) 178/115 101 20 98.7 °F (37.1 °C) 97 %      MAP       --         Physical Exam    Nursing note and vitals reviewed.  Constitutional: She appears well-developed and well-nourished.   HENT:   Head: Normocephalic and atraumatic.       Right Ear: Hearing, tympanic membrane, external ear and ear canal normal.   Left Ear: Hearing, tympanic membrane, external ear and ear canal normal.   Nose: Nose normal.   Mouth/Throat: Uvula is midline, oropharynx is clear and moist and mucous membranes are normal. No oropharyngeal exudate.   Eyes: Conjunctivae and EOM are normal. Pupils are equal, round, and reactive to light.   Neck: Trachea normal, normal range of motion, full passive range of motion without pain and phonation normal. Neck supple. No thyromegaly present. No spinous process tenderness and no  muscular tenderness present. Normal range of motion present. No neck rigidity. No Brudzinski's sign and no Kernig's sign noted. Carotid bruit is not present.   Cardiovascular: Normal rate, regular rhythm, normal heart sounds and intact distal pulses. Exam reveals no gallop and no friction rub.    No murmur heard.  Pulmonary/Chest: Breath sounds normal. No respiratory distress. She has no decreased breath sounds. She has no wheezes. She has no rhonchi. She exhibits no tenderness.   Abdominal: Soft. Bowel sounds are normal. She exhibits no distension. There is no abdominal tenderness. There is no rebound and no guarding.   Musculoskeletal: Normal range of motion. No tenderness or edema.        Right ankle: Normal. She exhibits normal pulse. Achilles tendon normal.        Left ankle: Normal. She exhibits normal pulse. Achilles tendon normal.      Cervical back: Normal.      Thoracic back: Normal.      Lumbar back: Normal.        Right hand: Normal. She exhibits normal capillary refill. Normal sensation noted. Normal strength noted.        Left hand: Normal. She exhibits normal capillary refill. Normal sensation noted. Normal strength noted.      Comments: Neurovascularly intact. Tendons intact. 2+radial and DP pulses, equal bilaterally. Normal capillary refill.     Lymphadenopathy:     She has no cervical adenopathy.   Neurological: She is alert and oriented to person, place, and time. She has normal strength. No cranial nerve deficit or sensory deficit. GCS eye subscore is 4. GCS verbal subscore is 5. GCS motor subscore is 6.   No acute focal neuro deficits   Skin: Skin is warm and dry. No rash noted.   Psychiatric: She has a normal mood and affect. Her behavior is normal. Judgment and thought content normal.         ED Course   Procedures  Labs Reviewed   HEPATITIS C ANTIBODY - Abnormal; Notable for the following components:       Result Value    Hepatitis C Ab Positive (*)     All other components within normal  "limits   URINALYSIS, REFLEX TO URINE CULTURE - Abnormal; Notable for the following components:    Specific Gravity, UA >=1.030 (*)     All other components within normal limits    Narrative:     Specimen Source->Urine          Imaging Results    None                Vitals:    10/13/20 1620 10/13/20 1650 10/13/20 1703 10/13/20 1746   BP: (!) 178/115 (!) 172/114 (!) 188/98 (!) 164/87   Pulse: 101  105 102   Resp: 20      Temp: 98.7 °F (37.1 °C)      TempSrc: Oral      SpO2: 97%  98% 100%   Weight: 117 kg (257 lb 15 oz)      Height: 5' 4" (1.626 m)       10/13/20 1811 10/13/20 1814   BP: (!) 192/86 (!) 192/86   Pulse: 101    Resp: 17 16   Temp:     TempSrc:     SpO2: 98% 100%   Weight:     Height:         Results for orders placed or performed during the hospital encounter of 10/13/20   Hepatitis C antibody   Result Value Ref Range    Hepatitis C Ab Positive (A) Negative   Urinalysis, Reflex to Urine Culture Urine, Clean Catch    Specimen: Urine   Result Value Ref Range    Specimen UA Urine, Clean Catch     Color, UA Yellow Yellow, Straw, Arlin    Appearance, UA Clear Clear    pH, UA 6.0 5.0 - 8.0    Specific Gravity, UA >=1.030 (A) 1.005 - 1.030    Protein, UA Negative Negative    Glucose, UA Negative Negative    Ketones, UA Negative Negative    Bilirubin (UA) Negative Negative    Occult Blood UA Negative Negative    Nitrite, UA Negative Negative    Urobilinogen, UA Negative <2.0 EU/dL    Leukocytes, UA Negative Negative         Imaging Results    None         Medications   cloNIDine tablet 0.2 mg (0.2 mg Oral Given 10/13/20 1650)   acetaminophen tablet 1,000 mg (1,000 mg Oral Given 10/13/20 1650)     6:00 PM - Re-evaluation: The patient is resting comfortably and is in no acute distress. She states that her symptoms have improved after treatment within ER.  Pt requesting refills of her bp meds. Discussed test results, shared treatment plan, specific conditions for return, and importance of follow up with patient and " family.  Advised close f/u c pcp within one week and to return to ER if symptoms return/persist/worsen.  She understands and agrees with the plan as discussed. Answered  her questions at this time. She has remained hemodynamically stable throughout the ED course and is appropriate for discharge home.     Patient's headache is consistent with previous headaches and lacks features concerning for emergent or life threatening condition.  I do not suspect SAH, meningitis, increased IC pressure, infectious, toxic, vascular, CNS, or other EMC.  I have discussed this at length with patient.  Regarding treatment, advised patient to take nonsteroidal antiinflammatory medications, acetaminophen, or any medications prescribed as instructed.  To prevent headaches, patient advised to avoid muscle tension (do not stay in one position for long periods of time), avoid eye strain (make sure there is adequate lighting for reading and routine tasks), eat healthy foods, exercise, and do not smoke or drink excessive alcohol.  Patient also advised to avoid overuse of over-the-counter or prescription medications. Patient was instructed to contact primary healthcare provider if: headaches continue to get worse; occur often enough that they affect daily work or normal activities; frequent medication use is needed to manage headaches; headaches that worsen and cause vomiting; or there are any questions or concerns about the condition or care. Advised patient to return to the emergency department or call 911 if they develop a sudden headache that presents suddenly and much worse than usual headaches; have difficulty seeing, speaking, or moving; become confused or have seizure activity; or develop a fever and stiff neck with the headache.     Regarding HYPERTENSION, I advised patient to: keep a record of blood pressure results; take your blood pressure medication exactly as directed without skipping doses; avoid medications that contain heart  stimulants, including over-the-counter drugs such as decongestants; maintain a healthy weight; cut back on sodium intake (i.e., limit canned, dried, packaged, and fast foods and dont add salt to food); follow the DASH (Dietary Approaches to Stop Hypertension) eating plan which recommends vegetables, fruits, whole gains, and other heart healthy foods; begin an exercise program that includes  aerobic exercise 3 to 4 times a week for an average of 40 minutes at a time (with approval of cardiologist or primary care provider); limit drinks that contain alcohol and caffeine; control levels of emotional stress; and seek emergency care for any shortness of breath, chest pain, difficulty speaking, confusion, or visual changes.      Pre-hypertension/Hypertension: The pt has been informed that they may have pre-hypertension or hypertension based on a blood pressure reading in the ED. I recommend that the pt call the PCP listed on their discharge instructions or a physician of their choice this week to arrange f/u for further evaluation of possible pre-hypertension or hypertension.     Yojana Durán was given a handout which discussed their disease process, precautions, and instructions for follow-up and therapy.    Follow-up Information     [facility]. Schedule an appointment as soon as possible for a visit in 1 week.    Contact information:  [address]  [place] [id number] [phone]             [facility] - Internal Medicine. Schedule an appointment as soon as possible for a visit in 1 week.    Specialty: Internal Medicine  Contact information:  [address]  [facility] [id number] [phone]           [facility].    Specialty: Emergency Medicine  Why: As needed, If symptoms worsen  Contact information:  [address]  [facility] [id number] [phone]                    Medication List      START taking these medications    naproxen 500 MG  "tablet  Commonly known as: NAPROSYN  Take 1 tablet (500 mg total) by mouth 2 (two) times daily with meals.        CONTINUE taking these medications    cloNIDine 0.2 MG tablet  Commonly known as: CATAPRES  Take 1 tablet (0.2 mg total) by mouth 2 (two) times daily.        ASK your doctor about these medications    acetaminophen 500 MG tablet  Commonly known as: TYLENOL     albuterol 90 mcg/actuation inhaler  Commonly known as: PROVENTIL/VENTOLIN HFA  Inhale 1-2 puffs into the lungs every 6 (six) hours as needed.     BIKTARVY -25 mg per tablet  Generic drug: nawjfugsx-nkrswdvv-aanovjh ala     blood sugar diagnostic Strp     cetirizine 10 MG tablet  Commonly known as: ZYRTEC     CLARITIN 10 mg tablet  Generic drug: loratadine     fluticasone propionate 50 mcg/actuation nasal spray  Commonly known as: FLONASE  1 spray (50 mcg total) by Each Nostril route once daily.     glimepiride 1 MG tablet  Commonly known as: AMARYL     LANTUS SOLOSTAR U-100 INSULIN glargine 100 units/mL (3mL) SubQ pen  Generic drug: insulin     lisinopriL-hydrochlorothiazide 20-25 mg Tab  Commonly known as: PRINZIDE,ZESTORETIC     NASAL SPRAY (OXYMETAZOLINE) 0.05 % nasal spray  Generic drug: oxymetazoline     ONETOUCH DELICA LANCETS 33 gauge Misc  Generic drug: lancets     ONETOUCH VERIO SYSTEM [facility]     pen needle, diabetic 31 gauge x 3/16" Ndle     VITAMIN D2 50,000 unit Cap  Generic drug: ergocalciferol           Where to Get Your Medications      You can get these medications from any pharmacy    Bring a paper prescription for each of these medications  · cloNIDine 0.2 MG tablet  · naproxen 500 MG tablet        Discharge Medication List as of 10/13/2020  6:01 PM      START taking these medications    Details   naproxen (NAPROSYN) 500 MG tablet Take 1 tablet (500 mg total) by mouth 2 (two) times daily with meals., Starting Tue 10/13/2020, Print               ED Diagnosis  1. Nonintractable headache, unspecified chronicity pattern, " unspecified headache type    2. Hypertension, unspecified type    3. Noncompliance with medication regimen                                 Clinical Impression:     ICD-10-CM ICD-9-CM   1. Nonintractable headache, unspecified chronicity pattern, unspecified headache type  R51.9 784.0   2. Hypertension, unspecified type  I10 401.9   3. Noncompliance with medication regimen  Z91.14 V15.81                      Disposition:   Disposition: Discharged  Condition: Stable     ED Disposition Condition    Discharge Stable        ED Prescriptions     Medication Sig Dispense Start Date End Date Auth. Provider    cloNIDine (CATAPRES) 0.2 MG tablet Take 1 tablet (0.2 mg total) by mouth 2 (two) times daily. 60 tablet 10/13/2020 11/12/2020 Ben Hay Jr., MD    naproxen (NAPROSYN) 500 MG tablet Take 1 tablet (500 mg total) by mouth 2 (two) times daily with meals. 20 tablet 10/13/2020  Ben Hay Jr., MD        Follow-up Information     Follow up With Specialties Details Why Contact [facility]  Schedule an appointment as soon as possible for a visit in 1 week  [address]  [place] [address]  [phone]      [facility] - Internal Medicine Internal Medicine Schedule an appointment as soon as possible for a visit in 1 week  [address] [address] [id number] [phone]    [facility] Emergency Medicine  As needed, If symptoms worsen [address]  [facility] [id number] [phone]                                       Ben Hay Jr., MD  10/17/20 0637

## 2020-10-14 LAB — HCV AB SERPL QL IA: POSITIVE

## 2020-10-14 NOTE — PROGRESS NOTES
I have attempted to call the patient, and have been unable to get in touch with them.  Can you please attempt to reach them and arrange follow up?  Thank you,

## 2020-10-15 ENCOUNTER — TELEPHONE (OUTPATIENT)
Dept: EMERGENCY MEDICINE | Facility: HOSPITAL | Age: 45
End: 2020-10-15

## 2020-10-15 DIAGNOSIS — R76.8 HEPATITIS C ANTIBODY POSITIVE IN BLOOD: Primary | ICD-10-CM

## 2021-05-30 ENCOUNTER — HOSPITAL ENCOUNTER (EMERGENCY)
Facility: HOSPITAL | Age: 46
Discharge: HOME | End: 2021-05-30
Attending: EMERGENCY MEDICINE
Payer: MEDICAID

## 2021-05-30 VITALS
WEIGHT: 255.06 LBS | TEMPERATURE: 98 F | OXYGEN SATURATION: 100 % | HEIGHT: 64 IN | RESPIRATION RATE: 20 BRPM | HEART RATE: 86 BPM | DIASTOLIC BLOOD PRESSURE: 109 MMHG | BODY MASS INDEX: 43.54 KG/M2 | SYSTOLIC BLOOD PRESSURE: 177 MMHG

## 2021-05-30 DIAGNOSIS — I10 HYPERTENSION, UNCONTROLLED: ICD-10-CM

## 2021-05-30 DIAGNOSIS — S80.861A INFECTED INSECT BITE OF RIGHT LEG, INITIAL ENCOUNTER: Primary | ICD-10-CM

## 2021-05-30 DIAGNOSIS — L08.9 INFECTED INSECT BITE OF RIGHT LEG, INITIAL ENCOUNTER: Primary | ICD-10-CM
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
## 2021-05-30 LAB
CTP QC/QA: YES
POCT GLUCOSE: 208 MG/DL (ref 70–110)
SARS-COV-2 RDRP RESP QL NAA+PROBE: NEGATIVE

## 2021-05-30 PROCEDURE — 99284 EMERGENCY DEPT VISIT MOD MDM: CPT | Mod: 25,ER

## 2021-05-30 PROCEDURE — U0002 COVID-19 LAB TEST NON-CDC: HCPCS | Mod: ER | Performed by: PHYSICIAN ASSISTANT

## 2021-05-30 PROCEDURE — 82962 GLUCOSE BLOOD TEST: CPT | Mod: ER

## 2021-05-30 PROCEDURE — 25000003 PHARM REV CODE 250: Mod: ER | Performed by: PHYSICIAN ASSISTANT

## 2021-05-30 RX ORDER — CLONIDINE HYDROCHLORIDE 0.1 MG/1
0.2 TABLET ORAL 2 TIMES DAILY
Qty: 60 TABLET | Refills: 1 | Status: SHIPPED | OUTPATIENT
Start: 2021-05-30 | End: 2022-02-12 | Stop reason: SDUPTHER

## 2021-05-30 RX ORDER — CLONIDINE HYDROCHLORIDE 0.2 MG/1
0.2 TABLET ORAL
Status: DISCONTINUED | OUTPATIENT
Start: 2021-05-30 | End: 2021-05-30

## 2021-05-30 RX ORDER — AMITRIPTYLINE HYDROCHLORIDE 50 MG/1
50 TABLET, FILM COATED ORAL NIGHTLY
Qty: 30 TABLET | Refills: 0 | Status: SHIPPED | OUTPATIENT
Start: 2021-05-30 | End: 2022-05-22 | Stop reason: SDUPTHER

## 2021-05-30 RX ORDER — CLINDAMYCIN HYDROCHLORIDE 300 MG/1
300 CAPSULE ORAL EVERY 8 HOURS
Qty: 21 CAPSULE | Refills: 0 | Status: SHIPPED | OUTPATIENT
Start: 2021-05-30 | End: 2021-06-06

## 2021-05-30 RX ORDER — CLONIDINE HYDROCHLORIDE 0.2 MG/1
0.2 TABLET ORAL ONCE
Status: COMPLETED | OUTPATIENT
Start: 2021-05-30 | End: 2021-05-30

## 2021-05-30 RX ADMIN — CLONIDINE HYDROCHLORIDE 0.2 MG: 0.2 TABLET ORAL at 08:05

## 2022-02-12 ENCOUNTER — TELEPHONE (OUTPATIENT)
Dept: EMERGENCY MEDICINE | Facility: HOSPITAL | Age: 47
End: 2022-02-12
Payer: MEDICAID

## 2022-02-12 ENCOUNTER — HOSPITAL ENCOUNTER (EMERGENCY)
Facility: HOSPITAL | Age: 47
Discharge: HOME | End: 2022-02-12
Attending: EMERGENCY MEDICINE
Payer: MEDICAID

## 2022-02-12 VITALS
DIASTOLIC BLOOD PRESSURE: 89 MMHG | BODY MASS INDEX: 41.21 KG/M2 | TEMPERATURE: 98 F | HEART RATE: 80 BPM | HEIGHT: 64 IN | OXYGEN SATURATION: 98 % | RESPIRATION RATE: 20 BRPM | WEIGHT: 241.38 LBS | SYSTOLIC BLOOD PRESSURE: 160 MMHG

## 2022-02-12 DIAGNOSIS — M79.10 MYALGIA: ICD-10-CM

## 2022-02-12 DIAGNOSIS — K08.89 TOOTHACHE: Primary | ICD-10-CM

## 2022-02-12 DIAGNOSIS — K04.7 DENTAL ABSCESS: ICD-10-CM

## 2022-02-12 PROCEDURE — 99284 EMERGENCY DEPT VISIT MOD MDM: CPT | Mod: ER

## 2022-02-12 RX ORDER — CLONIDINE HYDROCHLORIDE 0.2 MG/1
0.2 TABLET ORAL 2 TIMES DAILY
Qty: 60 TABLET | Refills: 0 | Status: SHIPPED | OUTPATIENT
Start: 2022-02-12 | End: 2022-05-22 | Stop reason: SDUPTHER

## 2022-02-12 RX ORDER — CETIRIZINE HYDROCHLORIDE 10 MG/1
TABLET ORAL
Qty: 30 TABLET | Refills: 0 | Status: SHIPPED | OUTPATIENT
Start: 2022-02-12 | End: 2022-05-22 | Stop reason: SDUPTHER

## 2022-02-12 RX ORDER — HYDROCODONE BITARTRATE AND ACETAMINOPHEN 5; 325 MG/1; MG/1
1 TABLET ORAL EVERY 4 HOURS PRN
Qty: 11 TABLET | Refills: 0 | Status: SHIPPED | OUTPATIENT
Start: 2022-02-12 | End: 2022-02-17

## 2022-02-12 RX ORDER — CLINDAMYCIN HYDROCHLORIDE 150 MG/1
450 CAPSULE ORAL EVERY 8 HOURS
Qty: 45 CAPSULE | Refills: 0 | Status: SHIPPED | OUTPATIENT
Start: 2022-02-12 | End: 2022-02-17

## 2022-02-12 RX ORDER — CLONIDINE HYDROCHLORIDE 0.1 MG/1
0.2 TABLET ORAL 2 TIMES DAILY
Qty: 60 TABLET | Refills: 1 | Status: SHIPPED | OUTPATIENT
Start: 2022-02-12 | End: 2022-02-12 | Stop reason: DRUGHIGH

## 2022-02-12 RX ORDER — BICTEGRAVIR SODIUM, EMTRICITABINE, AND TENOFOVIR ALAFENAMIDE FUMARATE 50; 200; 25 MG/1; MG/1; MG/1
TABLET ORAL
Qty: 30 TABLET | Refills: 0 | Status: SHIPPED | OUTPATIENT
Start: 2022-02-12

## 2022-02-12 RX ORDER — PROMETHAZINE HYDROCHLORIDE AND DEXTROMETHORPHAN HYDROBROMIDE 6.25; 15 MG/5ML; MG/5ML
5 SYRUP ORAL EVERY 6 HOURS PRN
Qty: 120 ML | Refills: 0 | Status: SHIPPED | OUTPATIENT
Start: 2022-02-12 | End: 2022-02-22

## 2022-02-12 NOTE — ED PROVIDER NOTES
"Encounter Date: 2022       History     Chief Complaint   Patient presents with    Generalized Body Aches     Body aches for few weeks and nasal congestion for 1 week.also top right gum pain secondary to "bad tooth"     The history is provided by the patient.   Dental Pain  The primary symptoms include mouth pain. Primary symptoms do not include fever, shortness of breath or sore throat. The symptoms began several days ago. The symptoms are unchanged. The symptoms are new. The symptoms occur constantly.   Mouth pain began 5 - 7 days ago. Mouth pain occurs constantly. Affected locations include: teeth and gum(s).   Additional symptoms include: gum swelling. Additional symptoms do not include: pain with swallowing, excessive salivation, ear pain and hearing loss.     Review of patient's allergies indicates:   Allergen Reactions    Morphine Itching    Naproxen     Penicillins     Toradol [ketorolac]      Past Medical History:   Diagnosis Date    Hepatitis C antibody positive in blood 10/13/2020    RNA NEG 2020    HIV (human immunodeficiency virus infection)     Hypertension      Past Surgical History:   Procedure Laterality Date    ANKLE FRACTURE SURGERY Left      SECTION      FRACTURE SURGERY      HYSTERECTOMY       No family history on file.  Social History     Tobacco Use    Smoking status: Current Every Day Smoker     Packs/day: 0.50    Smokeless tobacco: Never Used   Substance Use Topics    Alcohol use: No    Drug use: No     Review of Systems   Constitutional: Negative for fever.   HENT: Negative for ear pain, hearing loss and sore throat.    Respiratory: Negative for shortness of breath.    Cardiovascular: Negative for chest pain.   Gastrointestinal: Negative for nausea.   Genitourinary: Negative for dysuria.   Musculoskeletal: Negative for back pain.   Skin: Negative for rash.   Neurological: Negative for weakness.   Hematological: Does not bruise/bleed easily.       Physical " Exam     Initial Vitals [02/12/22 1323]   BP Pulse Resp Temp SpO2   (!) 160/89 80 20 98 °F (36.7 °C) 98 %      MAP       --         Physical Exam    Nursing note and vitals reviewed.  Constitutional: She appears well-developed and well-nourished. No distress.   HENT:   Head: Normocephalic and atraumatic.   Mouth/Throat: Oropharynx is clear and moist.   Multiple missing teeth and poor dentition   Eyes: Conjunctivae and EOM are normal. Pupils are equal, round, and reactive to light.   Neck: Neck supple.   Normal range of motion.  Cardiovascular: Normal rate, regular rhythm and normal heart sounds. Exam reveals no gallop and no friction rub.    No murmur heard.  Pulmonary/Chest: Breath sounds normal. No respiratory distress. She has no wheezes. She has no rhonchi. She has no rales.   Abdominal: Abdomen is soft. Bowel sounds are normal. She exhibits no distension and no mass. There is no abdominal tenderness. There is no rebound and no guarding.   Musculoskeletal:         General: No tenderness or edema. Normal range of motion.      Cervical back: Normal range of motion and neck supple.     Neurological: She is alert and oriented to person, place, and time. She has normal strength.   Skin: Skin is warm and dry. No rash noted.   Psychiatric: She has a normal mood and affect. Thought content normal.         ED Course   Procedures  Labs Reviewed - No data to display       Imaging Results    None          Medications - No data to display                       Clinical Impression:   Final diagnoses:  [K08.89] Toothache (Primary)  [K04.7] Dental abscess  [M79.10] Myalgia          ED Disposition Condition    Discharge Stable        ED Prescriptions     Medication Sig Dispense Start Date End Date Auth. Provider    cloNIDine (CATAPRES) 0.1 MG tablet Take 2 tablets (0.2 mg total) by mouth 2 (two) times daily. 60 tablet 2/12/2022 3/14/2022 Cedrick Oh MD    khkshbnpo-iomdbeoj-euxcuug ala (BIKTARVY) -25 mg per tablet  Biktarvy 50 mg-200 mg-25 mg tablet 30 tablet 2/12/2022  Cedrick Oh MD    cetirizine (ZYRTEC) 10 MG tablet cetirizine 10 mg tablet 30 tablet 2/12/2022  Cedrick Oh MD    clindamycin (CLEOCIN) 150 MG capsule Take 3 capsules (450 mg total) by mouth every 8 (eight) hours. for 5 days 45 capsule 2/12/2022 2/17/2022 Cedrick Oh MD    promethazine-dextromethorphan (PROMETHAZINE-DM) 6.25-15 mg/5 mL Syrp Take 5 mLs by mouth every 6 (six) hours as needed. 120 mL 2/12/2022 2/22/2022 Cedrick Oh MD    HYDROcodone-acetaminophen (NORCO) 5-325 mg per tablet Take 1 tablet by mouth every 4 (four) hours as needed for Pain. 11 tablet 2/12/2022 2/17/2022 Cedrick Oh MD        Follow-up Information    None          Cedrick Oh MD  02/12/22 0959

## 2022-05-22 ENCOUNTER — HOSPITAL ENCOUNTER (EMERGENCY)
Facility: HOSPITAL | Age: 47
Discharge: HOME | End: 2022-05-22
Attending: EMERGENCY MEDICINE
Payer: MEDICAID

## 2022-05-22 VITALS
WEIGHT: 246.69 LBS | BODY MASS INDEX: 42.35 KG/M2 | SYSTOLIC BLOOD PRESSURE: 169 MMHG | HEART RATE: 92 BPM | DIASTOLIC BLOOD PRESSURE: 107 MMHG | RESPIRATION RATE: 16 BRPM | TEMPERATURE: 98 F | OXYGEN SATURATION: 99 %

## 2022-05-22 DIAGNOSIS — M25.562 CHRONIC PAIN OF LEFT KNEE: Primary | ICD-10-CM

## 2022-05-22 DIAGNOSIS — G89.29 CHRONIC PAIN OF LEFT KNEE: Primary | ICD-10-CM

## 2022-05-22 PROCEDURE — 99284 EMERGENCY DEPT VISIT MOD MDM: CPT | Mod: ER

## 2022-05-22 RX ORDER — CLONIDINE HYDROCHLORIDE 0.2 MG/1
0.2 TABLET ORAL 2 TIMES DAILY
Qty: 60 TABLET | Refills: 0 | Status: SHIPPED | OUTPATIENT
Start: 2022-05-22 | End: 2023-05-22

## 2022-05-22 RX ORDER — CETIRIZINE HYDROCHLORIDE 10 MG/1
TABLET ORAL
Qty: 30 TABLET | Refills: 0 | Status: SHIPPED | OUTPATIENT
Start: 2022-05-22

## 2022-05-22 RX ORDER — AMITRIPTYLINE HYDROCHLORIDE 50 MG/1
50 TABLET, FILM COATED ORAL NIGHTLY
Qty: 30 TABLET | Refills: 0 | Status: SHIPPED | OUTPATIENT
Start: 2022-05-22

## 2022-05-22 NOTE — ED PROVIDER NOTES
Encounter Date: 2022       History     Chief Complaint   Patient presents with    Knee Pain     Left knee pain x 1 year. Pt has hx of arthritis and says it moved from right knee to the left. Pt also requesting refills on zyrtec, elavil, and clonidine. Out for 4 days.      The history is provided by the patient.   Knee Pain  This is a chronic problem. The current episode started more than 1 week ago. The problem occurs constantly. The problem has not changed since onset.Pertinent negatives include no chest pain, no abdominal pain, no headaches and no shortness of breath. The symptoms are aggravated by walking. Nothing relieves the symptoms.     Review of patient's allergies indicates:   Allergen Reactions    Ibuprofen Swelling     Fills up with fluid    Morphine Itching    Naproxen     Penicillins     Toradol [ketorolac]      Past Medical History:   Diagnosis Date    Hepatitis C antibody positive in blood 10/13/2020    RNA NEG 2020    HIV (human immunodeficiency virus infection)     Hypertension      Past Surgical History:   Procedure Laterality Date    ANKLE FRACTURE SURGERY Left      SECTION      FRACTURE SURGERY      HYSTERECTOMY       No family history on file.  Social History     Tobacco Use    Smoking status: Current Every Day Smoker     Packs/day: 0.50    Smokeless tobacco: Never Used   Substance Use Topics    Alcohol use: No    Drug use: No     Review of Systems   Constitutional: Negative for fever.   HENT: Negative for sore throat.    Respiratory: Negative for shortness of breath.    Cardiovascular: Negative for chest pain.   Gastrointestinal: Negative for abdominal pain and nausea.   Genitourinary: Negative for dysuria.   Musculoskeletal: Negative for back pain.   Skin: Negative for rash.   Neurological: Negative for weakness and headaches.   Hematological: Does not bruise/bleed easily.       Physical Exam     Initial Vitals [22 1426]   BP Pulse Resp Temp SpO2   (!)  169/107 92 16 98.2 °F (36.8 °C) 99 %      MAP       --         Physical Exam    Nursing note and vitals reviewed.  Constitutional: She appears well-developed and well-nourished. No distress.   HENT:   Head: Normocephalic and atraumatic.   Mouth/Throat: Oropharynx is clear and moist.   Eyes: Conjunctivae and EOM are normal. Pupils are equal, round, and reactive to light.   Neck: Neck supple.   Normal range of motion.  Cardiovascular: Normal rate, regular rhythm and normal heart sounds. Exam reveals no gallop and no friction rub.    No murmur heard.  Pulmonary/Chest: Breath sounds normal. No respiratory distress. She has no wheezes. She has no rhonchi. She has no rales.   Abdominal: Abdomen is soft. Bowel sounds are normal. She exhibits no distension and no mass. There is no abdominal tenderness. There is no rebound and no guarding.   Musculoskeletal:         General: No tenderness or edema. Normal range of motion.      Cervical back: Normal range of motion and neck supple.     Neurological: She is alert and oriented to person, place, and time. She has normal strength.   Skin: Skin is warm and dry. No rash noted.   Psychiatric: She has a normal mood and affect. Thought content normal.         ED Course   Procedures  Labs Reviewed - No data to display       Imaging Results          X-Ray Knee Complete 4 or more Views Left (Final result)  Result time 05/22/22 15:19:34    Final result by Samuel Pisano Jr., MD (05/22/22 15:19:34)                 Impression:      No acute osseous findings.  Osteoarthritis and chondrocalcinosis with a joint effusion.      Electronically signed by: Samuel Pisano Jr., MD  Date:    05/22/2022  Time:    15:19             Narrative:    EXAMINATION:  XR KNEE COMP 4 OR MORE VIEWS LEFT    CLINICAL HISTORY:  knee pain;    TECHNIQUE:  Four views of the left knee.    COMPARISON:  None    FINDINGS:  There is a healed fracture of the proximal left fibular diaphysis.  No acute fractures are visible.   Tricompartmental degenerative osteoarthritis most pronounced involving the medial compartment.  There are marginal osteophytes.  There is also chondrocalcinosis.  Moderate suprapatellar joint effusion.                                 Medications - No data to display                       Clinical Impression:   Final diagnoses:  [M25.562, G89.29] Chronic pain of left knee (Primary)          ED Disposition Condition    Discharge Stable        ED Prescriptions     Medication Sig Dispense Start Date End Date Auth. Provider    amitriptyline (ELAVIL) 50 MG tablet Take 1 tablet (50 mg total) by mouth every evening. 30 tablet 5/22/2022  Cedrick Oh MD    cetirizine (ZYRTEC) 10 MG tablet cetirizine 10 mg tablet 30 tablet 5/22/2022  Cedrick Oh MD    cloNIDine (CATAPRES) 0.2 MG tablet Take 1 tablet (0.2 mg total) by mouth 2 (two) times daily. 60 tablet 5/22/2022 5/22/2023 Cedrick Oh MD        Follow-up Information     Follow up With Specialties Details Why Contact Info    Melina Lorenzana MD Infectious Diseases, Internal Medicine   [address] [place] [id number] [phone]             Cedrick Oh MD  05/22/22 7022

## 2024-11-23 ENCOUNTER — HOSPITAL ENCOUNTER (EMERGENCY)
Facility: HOSPITAL | Age: 49
Discharge: HOME | End: 2024-11-23
Attending: EMERGENCY MEDICINE
Payer: MEDICAID

## 2024-11-23 VITALS
OXYGEN SATURATION: 100 % | HEART RATE: 88 BPM | HEIGHT: 64 IN | WEIGHT: 228 LBS | DIASTOLIC BLOOD PRESSURE: 110 MMHG | BODY MASS INDEX: 38.93 KG/M2 | RESPIRATION RATE: 17 BRPM | TEMPERATURE: 98 F | SYSTOLIC BLOOD PRESSURE: 187 MMHG

## 2024-11-23 DIAGNOSIS — M72.2 PLANTAR FASCIITIS: Primary | ICD-10-CM

## 2024-11-23 DIAGNOSIS — I16.0 HYPERTENSIVE URGENCY: ICD-10-CM

## 2024-11-23 DIAGNOSIS — M79.673 FOOT PAIN: ICD-10-CM

## 2024-11-23 PROCEDURE — 99284 EMERGENCY DEPT VISIT MOD MDM: CPT | Mod: 25

## 2024-11-23 RX ORDER — LOSARTAN POTASSIUM 25 MG/1
25 TABLET ORAL DAILY
Qty: 30 TABLET | Refills: 0 | Status: SHIPPED | OUTPATIENT
Start: 2024-11-23 | End: 2024-12-23

## 2024-11-23 RX ORDER — CLONIDINE HYDROCHLORIDE 0.2 MG/1
0.2 TABLET ORAL 2 TIMES DAILY
Qty: 60 TABLET | Refills: 0 | Status: SHIPPED | OUTPATIENT
Start: 2024-11-23 | End: 2024-12-23

## 2024-11-23 RX ORDER — INSULIN GLARGINE 100 [IU]/ML
40 INJECTION, SOLUTION SUBCUTANEOUS NIGHTLY
Qty: 12 ML | Refills: 0 | Status: SHIPPED | OUTPATIENT
Start: 2024-11-23 | End: 2024-12-23

## 2024-11-23 NOTE — ED PROVIDER NOTES
Chief complaint:  Foot Pain (R foot pain Denies known injury onset last night)      HPI:  Yojana Durán is a 49 y.o. female presenting with acute onset of pain to the underside of her right foot that started yesterday.  She denies any trauma however upon further questioning she states that 2 days ago she may have hit her foot on a car door.  She also has a history of high blood pressure but did not take her blood pressure medication this morning.  She was found to have a significantly elevated blood pressure but has no headache, chest pain, shortness of breath, numbness, weakness, visual changes.    ROS: As per HPI and below:  Constitutional:  No fevers, no chills  Cardiac: no chest pain  Respiratory: no shortness of breath  Abdominal: no abdominal pain, no nausea, no vomiting  Neuro: no focal numbness, no focal weakness        Review of patient's allergies indicates:   Allergen Reactions    Ibuprofen Swelling     Fills up with fluid    Morphine Itching    Naproxen     Penicillins     Toradol [ketorolac]        No current facility-administered medications on file prior to encounter.     Current Outpatient Medications on File Prior to Encounter   Medication Sig Dispense Refill    acetaminophen (TYLENOL) 500 MG tablet acetaminophen 500 mg tablet      albuterol (PROVENTIL/VENTOLIN HFA) 90 mcg/actuation inhaler Inhale 1-2 puffs into the lungs every 6 (six) hours as needed. 18 g 0    amitriptyline (ELAVIL) 50 MG tablet Take 1 tablet (50 mg total) by mouth every evening. 30 tablet 0    mxlepulbw-dbxkcadz-rkulakq ala (BIKTARVY) -25 mg per tablet Biktarvy 50 mg-200 mg-25 mg tablet 30 tablet 0    blood sugar diagnostic Strp OneTouch Verio strips      blood-glucose meter (ONETOUCH VERIO SYSTEM MISC) OneTouch Verio System      cetirizine (ZYRTEC) 10 MG tablet cetirizine 10 mg tablet 30 tablet 0    ergocalciferol (VITAMIN D2) 50,000 unit Cap Take 50,000 Units by mouth every 7 days.      glimepiride (AMARYL) 1 MG  "tablet glimepiride 1 mg tablet      lancets (ONETOUCH DELICA LANCETS) 33 gauge Misc OneTouch Delica Lancets 33 gauge      lisinopril-hydrochlorothiazide (PRINZIDE,ZESTORETIC) 20-25 mg Tab lisinopril 20 mg-hydrochlorothiazide 25 mg tablet      loratadine (CLARITIN) 10 mg tablet Take 10 mg by mouth once daily.      naproxen (NAPROSYN) 500 MG tablet Take 1 tablet (500 mg total) by mouth 2 (two) times daily with meals. 20 tablet 0    oxymetazoline (NASAL SPRAY, OXYMETAZOLINE,) 0.05 % nasal spray Nasal Spray (oxymetazoline) 0.05 %   2 SPRAYS BY EACH NOSTRIL ROUTE 2 (TWO) TIMES DAILY FOR 3 DAYS.      pen needle, diabetic 31 gauge x 3/16" Ndle Use as directed      [DISCONTINUED] cloNIDine (CATAPRES) 0.2 MG tablet Take 1 tablet (0.2 mg total) by mouth 2 (two) times daily. 60 tablet 0    [DISCONTINUED] insulin (LANTUS SOLOSTAR U-100 INSULIN) glargine 100 units/mL (3mL) SubQ pen Inject 40 Units into the skin every evening.         PMH:  As per HPI and below:  Past Medical History:   Diagnosis Date    Hepatitis C antibody positive in blood 10/13/2020    RNA NEG 2020    HIV (human immunodeficiency virus infection)     Hypertension      Past Surgical History:   Procedure Laterality Date    ANKLE FRACTURE SURGERY Left      SECTION      FRACTURE SURGERY      HYSTERECTOMY         Social History     Socioeconomic History    Marital status: Single   Tobacco Use    Smoking status: Every Day     Current packs/day: 0.50     Types: Cigarettes    Smokeless tobacco: Never   Substance and Sexual Activity    Alcohol use: No    Drug use: No       No family history on file.    Physical Exam:    Vitals:    24 1730   BP: (!) 163/91   Pulse:    Resp:    Temp:      Constitutional: Well-nourished, well-developed, in no acute distress, not cachectic  Eyes: PERRLA, EOMI, normal conjunctiva, normal sclera  ENT: Moist Mucous membranes  Respiratory: Clear to auscultation bilaterally, no wheezes, no crackles, no " rhonchi  Cardiovascular: Regular rate and rhythm, no murmurs, no rubs, no gallops  Abdominal: Soft, nontender, nondistended, no guarding, no rebound  Musculoskeletal: Normal range of motion, no obvious deformity, normal capillary refill, head atraumatic, neck supple, no meningismus, 2/2 bilateral lower extremity pulses, no leg swelling or calf tenderness, tenderness to palpation to the right medial aspect of the underside of her foot consistent with plantar fasciitis  Skin: no rash, no ecchymosis, no errythema, no discharge  Neurologic: Cranial nerves II through XII intact, no motor deficits, no sensory deficits, no cerebellar deficits  Psychological: Alert, oriented x3, normal affect, normal mood    Orders Placed This Encounter   Procedures    X-Ray Foot Complete Right    Hepatitis C Antibody    HIV 1/2 Ag/Ab (4th Gen)       Medications - No data to display      Labs Reviewed   HEPATITIS C ANTIBODY   HIV 1 / 2 ANTIBODY       X-Ray Foot Complete Right   Final Result      1. No acute displaced fracture or dislocation of the foot noting nonspecific edema.         Electronically signed by: Celio Allen MD   Date:    11/23/2024   Time:    18:13          Medical Decision Making  Differential diagnosis includes plantar fasciitis, foot fracture, hypertensive urgency, life-threatening hypertensive emergency    Patient presents with pain to her right foot at the bottom consistent with plantar fasciitis.  She does state that there may have been some trauma yesterday when she hit her foot on a car door.  Will x-ray.  Additionally, she was found to have an elevated blood pressure however, she has not taken any of her blood pressure medications.  She took her blood pressure medications 5 minutes prior to coming into the room.  She however will need refills of her blood pressure medications and her Lantus.    Patient's foot x-ray is negative.  Her blood pressure has improved after her home medication.  She can not take NSAIDs  but she was instructed to do foot stretching exercises and Tylenol for pain.  No evidence of end-organ damage and I feel she is safe to go home with her blood pressure medications.  Refills were called into the pharmacy for her blood pressure medication and Lantus.    Amount and/or Complexity of Data Reviewed  Radiology: ordered and independent interpretation performed.     Details: Foot x-ray: No acute process    Risk  Prescription drug management.      Procedures          ASSESSMENT  1. Plantar fasciitis    2. Foot pain    3. Hypertensive urgency          Disposition:  Discharged home in stable condition    New Prescriptions    LOSARTAN (COZAAR) 25 MG TABLET    Take 1 tablet (25 mg total) by mouth once daily.     Modified Medications    Modified Medication Previous Medication    CLONIDINE (CATAPRES) 0.2 MG TABLET cloNIDine (CATAPRES) 0.2 MG tablet       Take 1 tablet (0.2 mg total) by mouth 2 (two) times daily.    Take 1 tablet (0.2 mg total) by mouth 2 (two) times daily.    INSULIN GLARGINE U-100, LANTUS, (LANTUS SOLOSTAR U-100 INSULIN) 100 UNIT/ML (3 ML) INPN PEN insulin (LANTUS SOLOSTAR U-100 INSULIN) glargine 100 units/mL (3mL) SubQ pen       Inject 40 Units into the skin every evening.    Inject 40 Units into the skin every evening.     Discontinued Medications    No medications on file          Doug Palacios III, MD  11/23/24 [id number]

## 2024-11-24 NOTE — ED NOTES
C/C: 49 y.o. female arrived to the ED via POV for c/o foot pain. Patient with hx of HTN presents from home following accidental injury to medial right foot. Patient states when she was attempting out of her vehicle 2 days ago, she accidentally hit her right foot on the car door. Patient states that she has been ambulatory since fall, however, endorses pain upon weight bearing forces and plantar flexion. Denies numbness, tingling, any recent falls or trauma.     APPEARANCE: awake, alert, and appears to be in NAD. Pain score 0/10. Placed on auto BP cuff and cont pulse ox. Bed in lowest and locked position w/ side rails up x2. Call light w/n pt reach and instructed on how to use.   SKIN: warm, dry and intact. No visible breakdown or bruising noted to extremities.  MUSCULOSKELETAL: Patient moving all extremities spontaneously, no obvious swelling or deformities noted. Ambulates independently. +right medial foot pain  RESPIRATORY: Airway open and patent. Denies shortness of breath. Respirations even, unlabored, equal bilaterally on inspiration and expiration.   CARDIAC: Regular HR. Denies CP, 2+ distal pulses bilaterally; no peripheral edema  ABDOMEN: S/ND/NT, Denies N/V/D   : Pt voids spontaneously, denies difficulty  NEUROLOGIC: AAO x 4; follows commands equal strength in all extremities; denies numbness/tingling. Denies dizziness

## 2024-11-28 ENCOUNTER — HOSPITAL ENCOUNTER (EMERGENCY)
Facility: HOSPITAL | Age: 49
Discharge: HOME | End: 2024-11-28
Attending: EMERGENCY MEDICINE
Payer: MEDICAID

## 2024-11-28 VITALS
DIASTOLIC BLOOD PRESSURE: 98 MMHG | TEMPERATURE: 98 F | HEART RATE: 96 BPM | SYSTOLIC BLOOD PRESSURE: 188 MMHG | HEIGHT: 64 IN | BODY MASS INDEX: 42.34 KG/M2 | OXYGEN SATURATION: 100 % | RESPIRATION RATE: 18 BRPM | WEIGHT: 248 LBS

## 2024-11-28 DIAGNOSIS — J06.9 VIRAL URI WITH COUGH: Primary | ICD-10-CM

## 2024-11-28 DIAGNOSIS — R05.9 COUGH: ICD-10-CM

## 2024-11-28 PROCEDURE — 99281 EMR DPT VST MAYX REQ PHY/QHP: CPT

## 2024-11-28 NOTE — ED NOTES
Patient identifiers verified and correct for Yojana Muse   LOC: The patient is awake, alert and aware of environment with an appropriate affect, the patient is oriented x 3 and speaking appropriately. +R ear pain  APPEARANCE: Patient appears comfortable and in no acute distress  SKIN: The skin is warm and dry, color consistent with ethnicity, patient has normal skin turgor and moist mucus membranes, skin intact  MUSCULOSKELETAL: Patient moving all extremities spontaneously  RESPIRATORY: Airway is open and patent, respirations are spontaneous, patient has a normal effort and rate, no accessory muscle use noted, O2 sats noted at 100% on room air. +non productive cough and sore throat  CARDIAC: Patient has a normal rate, no edema noted, capillary refill < 3 seconds.   GASTRO: Soft and non tender to palpation, no distention noted  : Pt denies any pain or frequency with urination.  NEURO: Pt opens eyes spontaneously, behavior appropriate to situation, follows commands, facial expression symmetrical, bilateral hand grasp equal and even, purposeful motor response noted, normal sensation in all extremities when touched with a finger.

## 2024-11-28 NOTE — ED TRIAGE NOTES
Pt arriving to ED c/o non productive cough, sore throat and R ear pain x 2 days. Denies N/V, diarrhea, body aches, chills

## 2024-11-28 NOTE — DISCHARGE INSTRUCTIONS
For symptoms:  Ibuprofen 400mg every 6hrs  Tylenol 650mg every 6hrs  Mucinex sinus and cold (just know this also contains tylenol and you should not take over 3000mg of tylenol daily  Throat lozenges with benzocaine

## 2024-11-28 NOTE — ED PROVIDER NOTES
Encounter Date: 2024       History     Chief Complaint   Patient presents with    URI     R ear pain, sore throat     HPI    49-year-old female who presents with a cough.  She also reports sore throat and right ear pain and a headache.  Symptoms has been going on for a few days.  No fevers or chills.  Nothing comes up when she coughs.  No chest pain.  No vomiting or diarrhea.  No sick contacts.  She had 3 doses of steroids at home so she took these with no relief.    Review of patient's allergies indicates:   Allergen Reactions    Ibuprofen Swelling     Fills up with fluid    Morphine Itching    Naproxen     Penicillins     Toradol [ketorolac]      Past Medical History:   Diagnosis Date    Hepatitis C antibody positive in blood 10/13/2020    RNA NEG 2020    HIV (human immunodeficiency virus infection)     Hypertension      Past Surgical History:   Procedure Laterality Date    ANKLE FRACTURE SURGERY Left      SECTION      FRACTURE SURGERY      HYSTERECTOMY       No family history on file.  Social History     Tobacco Use    Smoking status: Every Day     Current packs/day: 0.50     Types: Cigarettes    Smokeless tobacco: Never   Substance Use Topics    Alcohol use: No    Drug use: No     Review of Systems    Physical Exam     Initial Vitals [24 1214]   BP Pulse Resp Temp SpO2   (!) 188/98 96 18 98.1 °F (36.7 °C) 100 %      MAP       --         Physical Exam    Nursing note and vitals reviewed.  Constitutional: She appears well-developed and well-nourished. No distress.   HENT:   Head: Normocephalic and atraumatic.   Nose: Nose normal.   Mild bilateral tonsillar swelling with erythema.  No exudates  Normal TMs and canals bilaterally   Eyes: Conjunctivae and EOM are normal. Pupils are equal, round, and reactive to light.   Neck:   Normal range of motion.  Cardiovascular:  Normal rate, regular rhythm and normal heart sounds.     Exam reveals no gallop and no friction rub.       No murmur  heard.  Pulmonary/Chest: Breath sounds normal. No respiratory distress. She has no wheezes. She has no rhonchi. She has no rales.   Abdominal: Abdomen is soft. She exhibits no distension. There is no abdominal tenderness. There is no rebound and no guarding.   Musculoskeletal:         General: No tenderness or edema. Normal range of motion.      Cervical back: Normal range of motion.     Neurological: She is alert and oriented to person, place, and time. She has normal strength.   Skin: Skin is warm and dry. Capillary refill takes less than 2 seconds.   Psychiatric: She has a normal mood and affect.         ED Course   Procedures  Labs Reviewed   GROUP A STREP, MOLECULAR   SARS-COV2 (COVID) WITH FLU/RSV BY PCR          Imaging Results    None          Medications - No data to display  Medical Decision Making  49-year-old female who presents concerned with dry cough, ear pain, sore throat.  No fevers.  Vital signs show hypertension but otherwise stable.  Exam overall is benign.  She was speaking in full sentences.  She was normal lung sounds.  She was no evidence of otitis media on exam.  She was some mild tonsillar swelling but no exudates.  No signs of PTA or RPA.  Overall looks quite well.  I offered to do a chest x-ray, viral swabs, strep swab however the patient decided to leave.  I advised that she would only need antibiotics if strep or pneumonia however otherwise very likely just to be a viral URI and she could use symptomatic management over-the-counter.    Amount and/or Complexity of Data Reviewed  Radiology: ordered.                                      Clinical Impression:  Final diagnoses:  [R05.9] Cough  [J06.9] Viral URI with cough (Primary)          ED Disposition Condition    Discharge Stable          ED Prescriptions    None       Follow-up Information       Follow up With Specialties Details Why Contact Info    Melina Lorenzana MD Infectious Diseases, Internal Medicine Schedule an appointment  as soon as possible for a visit   [address] [facility] [id number]  [phone]               Jenny Joseph MD  11/28/24 [id number]

## 2025-02-22 ENCOUNTER — HOSPITAL ENCOUNTER (EMERGENCY)
Facility: HOSPITAL | Age: 50
Discharge: HOME | End: 2025-02-22
Attending: EMERGENCY MEDICINE
Payer: MEDICAID

## 2025-02-22 VITALS
RESPIRATION RATE: 16 BRPM | HEART RATE: 79 BPM | TEMPERATURE: 98 F | WEIGHT: 258.5 LBS | DIASTOLIC BLOOD PRESSURE: 96 MMHG | OXYGEN SATURATION: 100 % | BODY MASS INDEX: 44.37 KG/M2 | SYSTOLIC BLOOD PRESSURE: 173 MMHG

## 2025-02-22 DIAGNOSIS — E87.6 LOW BLOOD POTASSIUM: ICD-10-CM

## 2025-02-22 DIAGNOSIS — R73.9 HYPERGLYCEMIA: ICD-10-CM

## 2025-02-22 DIAGNOSIS — J30.2 SEASONAL ALLERGIES: ICD-10-CM

## 2025-02-22 DIAGNOSIS — I10 HYPERTENSION: ICD-10-CM

## 2025-02-22 DIAGNOSIS — D64.9 ANEMIA, UNSPECIFIED TYPE: ICD-10-CM

## 2025-02-22 DIAGNOSIS — I10 HYPERTENSION, POOR CONTROL: ICD-10-CM

## 2025-02-22 DIAGNOSIS — Z76.0 ENCOUNTER FOR MEDICATION REFILL: Primary | ICD-10-CM

## 2025-02-22 DIAGNOSIS — D72.829 LEUKOCYTOSIS, UNSPECIFIED TYPE: ICD-10-CM

## 2025-02-22 DIAGNOSIS — Z72.0 TOBACCO ABUSE: ICD-10-CM

## 2025-02-22 LAB
ALBUMIN SERPL BCP-MCNC: 3.5 G/DL (ref 3.5–5.2)
ALP SERPL-CCNC: 161 U/L (ref 40–150)
ALT SERPL W/O P-5'-P-CCNC: 14 U/L (ref 10–44)
ANION GAP SERPL CALC-SCNC: 13 MMOL/L (ref 8–16)
AST SERPL-CCNC: 11 U/L (ref 10–40)
BACTERIA #/AREA URNS AUTO: NORMAL /HPF
BASOPHILS # BLD AUTO: 0.04 K/UL (ref 0–0.2)
BASOPHILS NFR BLD: 0.3 % (ref 0–1.9)
BILIRUB SERPL-MCNC: 0.1 MG/DL (ref 0.1–1)
BILIRUB UR QL STRIP: NEGATIVE
BUN SERPL-MCNC: 20 MG/DL (ref 6–20)
CALCIUM SERPL-MCNC: 8.9 MG/DL (ref 8.7–10.5)
CHLORIDE SERPL-SCNC: 100 MMOL/L (ref 95–110)
CLARITY UR REFRACT.AUTO: CLEAR
CO2 SERPL-SCNC: 24 MMOL/L (ref 23–29)
COLOR UR AUTO: YELLOW
CREAT SERPL-MCNC: 1.1 MG/DL (ref 0.5–1.4)
DIFFERENTIAL METHOD BLD: ABNORMAL
EOSINOPHIL # BLD AUTO: 0.1 K/UL (ref 0–0.5)
EOSINOPHIL NFR BLD: 0.6 % (ref 0–8)
ERYTHROCYTE [DISTWIDTH] IN BLOOD BY AUTOMATED COUNT: 14.5 % (ref 11.5–14.5)
EST. GFR  (NO RACE VARIABLE): >60 ML/MIN/1.73 M^2
GLUCOSE SERPL-MCNC: 346 MG/DL (ref 70–110)
GLUCOSE UR QL STRIP: ABNORMAL
HCT VFR BLD AUTO: 33.1 % (ref 37–48.5)
HGB BLD-MCNC: 10.7 G/DL (ref 12–16)
HGB UR QL STRIP: NEGATIVE
IMM GRANULOCYTES # BLD AUTO: 0.07 K/UL (ref 0–0.04)
IMM GRANULOCYTES NFR BLD AUTO: 0.5 % (ref 0–0.5)
KETONES UR QL STRIP: NEGATIVE
LEUKOCYTE ESTERASE UR QL STRIP: NEGATIVE
LYMPHOCYTES # BLD AUTO: 3.1 K/UL (ref 1–4.8)
LYMPHOCYTES NFR BLD: 21.6 % (ref 18–48)
MCH RBC QN AUTO: 30.5 PG (ref 27–31)
MCHC RBC AUTO-ENTMCNC: 32.3 G/DL (ref 32–36)
MCV RBC AUTO: 94 FL (ref 82–98)
MICROSCOPIC COMMENT: NORMAL
MONOCYTES # BLD AUTO: 0.8 K/UL (ref 0.3–1)
MONOCYTES NFR BLD: 5.5 % (ref 4–15)
NEUTROPHILS # BLD AUTO: 10.4 K/UL (ref 1.8–7.7)
NEUTROPHILS NFR BLD: 71.5 % (ref 38–73)
NITRITE UR QL STRIP: NEGATIVE
NRBC BLD-RTO: 0 /100 WBC
PH UR STRIP: 7 [PH] (ref 5–8)
PLATELET # BLD AUTO: 340 K/UL (ref 150–450)
PMV BLD AUTO: 9.9 FL (ref 9.2–12.9)
POCT GLUCOSE: 288 MG/DL (ref 70–110)
POTASSIUM SERPL-SCNC: 3.1 MMOL/L (ref 3.5–5.1)
PROT SERPL-MCNC: 7.4 G/DL (ref 6–8.4)
PROT UR QL STRIP: NEGATIVE
RBC # BLD AUTO: 3.51 M/UL (ref 4–5.4)
SODIUM SERPL-SCNC: 137 MMOL/L (ref 136–145)
SP GR UR STRIP: 1.01 (ref 1–1.03)
SQUAMOUS #/AREA URNS AUTO: 2 /HPF
URN SPEC COLLECT METH UR: ABNORMAL
UROBILINOGEN UR STRIP-ACNC: NEGATIVE EU/DL
WBC # BLD AUTO: 14.47 K/UL (ref 3.9–12.7)
WBC #/AREA URNS AUTO: 1 /HPF (ref 0–5)
YEAST UR QL AUTO: NORMAL

## 2025-02-22 PROCEDURE — 82962 GLUCOSE BLOOD TEST: CPT | Mod: ER

## 2025-02-22 PROCEDURE — 96374 THER/PROPH/DIAG INJ IV PUSH: CPT | Mod: ER

## 2025-02-22 PROCEDURE — 87389 HIV-1 AG W/HIV-1&-2 AB AG IA: CPT | Mod: 91 | Performed by: EMERGENCY MEDICINE

## 2025-02-22 PROCEDURE — 81000 URINALYSIS NONAUTO W/SCOPE: CPT | Mod: ER | Performed by: EMERGENCY MEDICINE

## 2025-02-22 PROCEDURE — 93005 ELECTROCARDIOGRAM TRACING: CPT | Mod: ER

## 2025-02-22 PROCEDURE — 93010 ELECTROCARDIOGRAM REPORT: CPT | Mod: ,,, | Performed by: INTERNAL MEDICINE

## 2025-02-22 PROCEDURE — 25000003 PHARM REV CODE 250: Mod: ER | Performed by: EMERGENCY MEDICINE

## 2025-02-22 PROCEDURE — 85025 COMPLETE CBC W/AUTO DIFF WBC: CPT | Mod: ER | Performed by: EMERGENCY MEDICINE

## 2025-02-22 PROCEDURE — 87522 HEPATITIS C REVRS TRNSCRPJ: CPT | Performed by: EMERGENCY MEDICINE

## 2025-02-22 PROCEDURE — 99285 EMERGENCY DEPT VISIT HI MDM: CPT | Mod: 25,ER

## 2025-02-22 PROCEDURE — 87389 HIV-1 AG W/HIV-1&-2 AB AG IA: CPT | Performed by: EMERGENCY MEDICINE

## 2025-02-22 PROCEDURE — 63600175 PHARM REV CODE 636 W HCPCS: Mod: ER | Performed by: EMERGENCY MEDICINE

## 2025-02-22 PROCEDURE — 80053 COMPREHEN METABOLIC PANEL: CPT | Mod: ER | Performed by: EMERGENCY MEDICINE

## 2025-02-22 PROCEDURE — 96361 HYDRATE IV INFUSION ADD-ON: CPT | Mod: ER

## 2025-02-22 PROCEDURE — 86803 HEPATITIS C AB TEST: CPT | Performed by: EMERGENCY MEDICINE

## 2025-02-22 RX ORDER — AZELASTINE 1 MG/ML
1 SPRAY, METERED NASAL 2 TIMES DAILY
Qty: 30 ML | Refills: 3 | Status: SHIPPED | OUTPATIENT
Start: 2025-02-22 | End: 2025-06-22

## 2025-02-22 RX ORDER — LOSARTAN POTASSIUM 50 MG/1
50 TABLET ORAL DAILY
Qty: 30 TABLET | Refills: 1 | Status: SHIPPED | OUTPATIENT
Start: 2025-02-22 | End: 2025-04-23

## 2025-02-22 RX ORDER — LABETALOL HYDROCHLORIDE 5 MG/ML
10 INJECTION, SOLUTION INTRAVENOUS
Status: COMPLETED | OUTPATIENT
Start: 2025-02-22 | End: 2025-02-22

## 2025-02-22 RX ORDER — POTASSIUM CHLORIDE 20 MEQ/1
40 TABLET, EXTENDED RELEASE ORAL
Status: COMPLETED | OUTPATIENT
Start: 2025-02-22 | End: 2025-02-22

## 2025-02-22 RX ORDER — FLUTICASONE PROPIONATE 50 MCG
1 SPRAY, SUSPENSION (ML) NASAL 2 TIMES DAILY PRN
Qty: 18.2 G | Refills: 3 | Status: SHIPPED | OUTPATIENT
Start: 2025-02-22 | End: 2025-06-22

## 2025-02-22 RX ORDER — IBUPROFEN 200 MG
1 TABLET ORAL DAILY
Qty: 42 PATCH | Refills: 0 | Status: SHIPPED | OUTPATIENT
Start: 2025-02-22 | End: 2025-04-05

## 2025-02-22 RX ORDER — CLONIDINE HYDROCHLORIDE 0.2 MG/1
0.2 TABLET ORAL 2 TIMES DAILY
Qty: 60 TABLET | Refills: 1 | Status: SHIPPED | OUTPATIENT
Start: 2025-02-22 | End: 2025-04-23

## 2025-02-22 RX ORDER — CLONIDINE HYDROCHLORIDE 0.2 MG/1
0.2 TABLET ORAL
Status: COMPLETED | OUTPATIENT
Start: 2025-02-22 | End: 2025-02-22

## 2025-02-22 RX ORDER — LOSARTAN POTASSIUM 25 MG/1
25 TABLET ORAL
Status: COMPLETED | OUTPATIENT
Start: 2025-02-22 | End: 2025-02-22

## 2025-02-22 RX ADMIN — POTASSIUM CHLORIDE 40 MEQ: 1500 TABLET, EXTENDED RELEASE ORAL at 06:02

## 2025-02-22 RX ADMIN — SODIUM CHLORIDE, POTASSIUM CHLORIDE, SODIUM LACTATE AND CALCIUM CHLORIDE 1000 ML: 600; 310; 30; 20 INJECTION, SOLUTION INTRAVENOUS at 06:02

## 2025-02-22 RX ADMIN — CLONIDINE HYDROCHLORIDE 0.2 MG: 0.2 TABLET ORAL at 05:02

## 2025-02-22 RX ADMIN — LABETALOL HYDROCHLORIDE 10 MG: 5 INJECTION INTRAVENOUS at 07:02

## 2025-02-22 RX ADMIN — LOSARTAN POTASSIUM 25 MG: 25 TABLET, FILM COATED ORAL at 05:02

## 2025-02-22 NOTE — ED PROVIDER NOTES
Encounter Date: 2025       History     Chief Complaint   Patient presents with    Hypertension     Ran out of clonidine 0.2 x twice a day on Friday. Urgent care sent to er. C/o headache.       Underlying history of hypertension, has been on losartan 25 mg once a day which she has been compliant with including this morning, and clonidine 0.2 mg p.o. b.i.d. with what she has also been compliant but ran out yesterday.  She is in between primary care providers, she has an appointment to get established with a new 1 soon, and went to urgent care today for a refill.  Her blood pressure was elevated there and they referred her to the ER.  She has a mild headache but otherwise feels fine.  In particular, she denies neck pain, chest pain, back pain, dyspnea, cough, lower extremity swelling, palpitations, visual disturbance, or other complaints.  Counseled regarding blood pressure medicine and monitoring, she has not been monitoring herself at all.    The history is provided by the patient. No  was used.     Review of patient's allergies indicates:   Allergen Reactions    Ibuprofen Swelling     Fills up with fluid    Morphine Itching    Naproxen     Penicillins     Toradol [ketorolac]      Past Medical History:   Diagnosis Date    Hepatitis C antibody positive in blood 10/13/2020    RNA NEG 2020    HIV (human immunodeficiency virus infection)     Hypertension      Past Surgical History:   Procedure Laterality Date    ANKLE FRACTURE SURGERY Left      SECTION      FRACTURE SURGERY      HYSTERECTOMY       No family history on file.  Social History[1]  Review of Systems   Constitutional:  Negative for chills and fever.   HENT:  Positive for sneezing.    Eyes:  Negative for visual disturbance.   Respiratory:  Negative for chest tightness and shortness of breath.    Cardiovascular:  Negative for chest pain, palpitations and leg swelling.   Allergic/Immunologic: Positive for environmental  allergies.   Neurological:  Positive for headaches (Very slight, not associated with neurololgic symtoms). Negative for dizziness, speech difficulty and weakness.       Physical Exam     Initial Vitals [02/22/25 1722]   BP Pulse Resp Temp SpO2   (!) 223/116 97 16 97.8 °F (36.6 °C) 100 %      MAP       --         Physical Exam    Nursing note and vitals reviewed.  Constitutional: She appears well-developed and well-nourished. No distress.   Hypertensive; /116   HENT:   Head: Normocephalic and atraumatic.   Eyes: EOM are normal. Pupils are equal, round, and reactive to light.   Neck: Neck supple.   Normal range of motion.  Cardiovascular:  Normal rate and regular rhythm.           Pulmonary/Chest: Breath sounds normal. No respiratory distress.   Abdominal: Abdomen is soft. Bowel sounds are normal. She exhibits no distension.   Musculoskeletal:         General: No tenderness or edema. Normal range of motion.      Cervical back: Normal range of motion and neck supple.     Neurological: She is alert and oriented to person, place, and time. She has normal strength.   Skin: Skin is warm and dry.   Psychiatric: She has a normal mood and affect. Thought content normal.         ED Course   Critical Care    Date/Time: 2/22/2025 6:00 PM    Performed by: Radha Sanchez DO  Authorized by: Radha Sanchez DO  Direct patient critical care time: 25 minutes  Ordering / reviewing critical care time: 6 minutes  Total critical care time (exclusive of procedural time) : 31 minutes  Critical care time was exclusive of separately billable procedures and treating other patients.  Critical care was necessary to treat or prevent imminent or life-threatening deterioration of the following conditions: circulatory failure and metabolic crisis.  Critical care was time spent personally by me on the following activities: blood draw for specimens, development of treatment plan with patient or surrogate, discussions with consultants,  discussions with primary provider, evaluation of patient's response to treatment, examination of patient, obtaining history from patient or surrogate, ordering and performing treatments and interventions, ordering and review of laboratory studies, ordering and review of radiographic studies, pulse oximetry, re-evaluation of patient's condition and vascular access procedures.  Subsequent provider of critical care: I assumed direction of critical care for this patient from another provider of my specialty.        Labs Reviewed   CBC W/ AUTO DIFFERENTIAL - Abnormal       Result Value    WBC 14.47 (*)     RBC 3.51 (*)     Hemoglobin 10.7 (*)     Hematocrit 33.1 (*)     MCV 94      MCH 30.5      MCHC 32.3      RDW 14.5      Platelets 340      MPV 9.9      Immature Granulocytes 0.5      Gran # (ANC) 10.4 (*)     Immature Grans (Abs) 0.07 (*)     Lymph # 3.1      Mono # 0.8      Eos # 0.1      Baso # 0.04      nRBC 0      Gran % 71.5      Lymph % 21.6      Mono % 5.5      Eosinophil % 0.6      Basophil % 0.3      Differential Method Automated      Narrative:     Release to patient->Immediate   COMPREHENSIVE METABOLIC PANEL - Abnormal    Sodium 137      Potassium 3.1 (*)     Chloride 100      CO2 24      Glucose 346 (*)     BUN 20      Creatinine 1.1      Calcium 8.9      Total Protein 7.4      Albumin 3.5      Total Bilirubin 0.1      Alkaline Phosphatase 161 (*)     AST 11      ALT 14      eGFR >60.0      Anion Gap 13      Narrative:     Release to patient->Immediate   HEPATITIS C ANTIBODY   HEP C VIRUS HOLD SPECIMEN   HIV 1 / 2 ANTIBODY   URINALYSIS, REFLEX TO URINE CULTURE     EKG Readings: (Independently Interpreted)   Initial Reading: No STEMI. Rhythm: Normal Sinus Rhythm. Heart Rate: 92. Ectopy: No Ectopy. Conduction: Normal. ST Segments: Normal ST Segments. T Waves: Normal. Clinical Impression: Normal Sinus Rhythm   Mildly noisy baseline; minimal lateral nonspecific T-wave inversion     Results for orders placed or  performed during the hospital encounter of 02/22/25   CBC auto differential    Collection Time: 02/22/25  5:45 PM   Result Value Ref Range    WBC 14.47 (H) 3.90 - 12.70 K/uL    RBC 3.51 (L) 4.00 - 5.40 M/uL    Hemoglobin 10.7 (L) 12.0 - 16.0 g/dL    Hematocrit 33.1 (L) 37.0 - 48.5 %    MCV 94 82 - 98 fL    MCH 30.5 27.0 - 31.0 pg    MCHC 32.3 32.0 - 36.0 g/dL    RDW 14.5 11.5 - 14.5 %    Platelets 340 150 - 450 K/uL    MPV 9.9 9.2 - 12.9 fL    Immature Granulocytes 0.5 0.0 - 0.5 %    Gran # (ANC) 10.4 (H) 1.8 - 7.7 K/uL    Immature Grans (Abs) 0.07 (H) 0.00 - 0.04 K/uL    Lymph # 3.1 1.0 - 4.8 K/uL    Mono # 0.8 0.3 - 1.0 K/uL    Eos # 0.1 0.0 - 0.5 K/uL    Baso # 0.04 0.00 - 0.20 K/uL    nRBC 0 0 /100 WBC    Gran % 71.5 38.0 - 73.0 %    Lymph % 21.6 18.0 - 48.0 %    Mono % 5.5 4.0 - 15.0 %    Eosinophil % 0.6 0.0 - 8.0 %    Basophil % 0.3 0.0 - 1.9 %    Differential Method Automated    Comprehensive metabolic panel    Collection Time: 02/22/25  5:45 PM   Result Value Ref Range    Sodium 137 136 - 145 mmol/L    Potassium 3.1 (L) 3.5 - 5.1 mmol/L    Chloride 100 95 - 110 mmol/L    CO2 24 23 - 29 mmol/L    Glucose 346 (H) 70 - 110 mg/dL    BUN 20 6 - 20 mg/dL    Creatinine 1.1 0.5 - 1.4 mg/dL    Calcium 8.9 8.7 - 10.5 mg/dL    Total Protein 7.4 6.0 - 8.4 g/dL    Albumin 3.5 3.5 - 5.2 g/dL    Total Bilirubin 0.1 0.1 - 1.0 mg/dL    Alkaline Phosphatase 161 (H) 40 - 150 U/L    AST 11 10 - 40 U/L    ALT 14 10 - 44 U/L    eGFR >60.0 >60 mL/min/1.73 m^2    Anion Gap 13 8 - 16 mmol/L       ECG Results              EKG 12-lead (Preliminary result)  Result time 02/22/25 18:14:20      Wet Read by Radha Sanchez DO (02/22/25 18:14:20, [facility] - Emergency Dept, Emergency Medicine)    Rate of 92 beats per minute.  Normal sinus rhythm.  Normal axis.  No ST segment elevation.  No STEMI                                  Imaging Results              X-Ray Chest PA And Lateral (In process)  Result time 02/22/25 18:21:23                      Medications   lactated ringers bolus 1,000 mL (1,000 mLs Intravenous New Bag 2/22/25 1824)   cloNIDine tablet 0.2 mg (0.2 mg Oral Given 2/22/25 1739)   losartan tablet 25 mg (25 mg Oral Given 2/22/25 1739)   potassium chloride SA CR tablet 40 mEq (40 mEq Oral Given 2/22/25 1822)     Medical Decision Making  Differential diagnosis: Dietary noncompliance, medicine noncompliance, acute kidney injury, electrolyte abnormality    ED course: Patient was placed on monitor.  Patient's losartan was increased to 50 mg a day.  Patient was restarted on her Catapres 0.2 mg p.o..  She was counseled on not abruptly stopping Catapres.  Patient appeared awake alert oriented.  GCS 15.  Patient ambulates without difficulty.  There did not appear to be any signs of end-organ damage.  EKG obtained:  Sinus rhythm.  No signs of STEMI.  K +3.1.  Replaced with 40 mEq KCL.  Blood sugar 346.  Patient admits to noncompliance with diet.  Patient also request nicotine patch.  Patient referred to smoking sensation.  White cell count 14.47.  Patient has history of HIV.  Nondetectable viral load 1 month ago.  Denies any fever chills.  Patient is nontoxic appearing.  Afebrile here in the emergency room.  Patient does have mild anemia hemoglobin/hematocrit 10.7/33.1.  Urinalysis 3+ sugar.  Negative leukocyte esterase and nitrites.  Patient was given 1 L LR.  Blood sugar improved to 288.  Patient's blood pressure remained hypertensive.  Patient was given 10 mg of IV labetalol.  Blood pressure improved to 173/96.  At the time of discharge, patient was awake alert oriented.  No focal deficits.  Patient was provided prescriptions Catapres 0.2 mg, losartan 50 mg to treat high blood pressure.  Fluticasone and Astelin to help treat nasal allergies.  And nicotine patch.    Amount and/or Complexity of Data Reviewed  Labs: ordered. Decision-making details documented in ED Course.  Radiology: ordered and independent interpretation performed.  Decision-making details documented in ED Course.  ECG/medicine tests: ordered and independent interpretation performed. Decision-making details documented in ED Course.    Risk  OTC drugs.  Prescription drug management.  Risk Details: New Prescriptions  AZELASTINE (ASTELIN) 137 MCG (0.1 %) NASAL SPRAY     1 spray (137 mcg total) by Nasal route 2 (two) times daily.  CLONIDINE (CATAPRES) 0.2 MG TABLET     Take 1 tablet (0.2 mg total) by mouth 2 (two) times daily.  FLUTICASONE PROPIONATE (FLONASE) 50 MCG/ACTUATION NASAL SPRAY     1 spray (50 mcg total) by Each Nostril route 2 (two) times daily as needed for Rhinitis or Allergies.  LOSARTAN (COZAAR) 50 MG TABLET     Take 1 tablet (50 mg total) by mouth once daily.  NICOTINE (NICODERM CQ) 21 MG/24 HR     Place 1 patch onto the skin once daily.      Critical Care  Total time providing critical care: 31 minutes      Additional MDM:   Hyperglycemia: Initial glucose level was 346. The initial potassium level was 3.1. After the glucose level was received the following occurred: treatment was started. The repeat glucose level was 288. Therapy: IV fluid bolus. The patient tolerated the above treatment and did not have any complications.   Hypertension: The patient has hypertensive urgency (systolic BP > 180 and/or diastolic BP > 110 with no end organ damage). Medications given: Catapres PO. Response: The patient's BP was controlled using oral medications.   Smoking Cessation: The patient is a smoker. The patient was counseled on smoking cessation for: 3 minutes. The patient was counseled on tobacco related  health complications. The patient was referred to a tobacco treatment program. The patient was counseled on how exercise will aide in tobacco cessation.           ED Course as of 02/22/25 1956   Sat Feb 22, 2025   1812 Care turned over from Dr. Melendez.  Patient had run out of her Catapres.  Patient denies any chest pain, chest pressure, shortness of breath, numbness or  weakness to 1 side, slurred speech, difficulty with balance.  On exam, GCS is 15.  Cranial nerves 2-12 intact.   Finger-to-nose intact.  Speech clear.  Ambulates without difficulty No focal deficits.  Heart regular rate and rhythm.  Of note, patient is asking for something to help with her sinuses.  She notes that she sneezes. [LB]   1814 Potassium(!): 3.1 [LB]   1818 Discussed with patient elevated white cell count.  Patient does note urinary frequency.  Patient denies any fevers or chills.  Patient does have history of HIV.  Patient is nondetectable. [LB]   1832 Patient also requesting nicotine patch.  Interested in quitting smoking [LB]   1951 Blood sugar 288. [LB]      ED Course User Index  [LB] Radha Sanchez, DO                      Medications   cloNIDine tablet 0.2 mg (0.2 mg Oral Given 2/22/25 1739)   losartan tablet 25 mg (25 mg Oral Given 2/22/25 1739)   potassium chloride SA CR tablet 40 mEq (40 mEq Oral Given 2/22/25 1822)   lactated ringers bolus 1,000 mL (0 mLs Intravenous Stopped 2/22/25 1951)   labetaloL injection 10 mg (10 mg Intravenous Given 2/22/25 1939)       7:58 PM Reassessment: Dr. Sanchez reassessed the pt.  The pt is resting comfortably and is NAD.  Pt states their sx have improved. Discussed test results, shared treatment plan, specific conditions for return, and the need for f/u.  Answered their questions at this time.  Pt understands and agrees to the plan.  The pt has remained hemodynamically stable through ED course and is stable for discharge.     Follow-up Information       [place] -.    Why: Or your primary care provider of choice:  Return to emergency department for severe headache, numbness or weakness to 1 side, vision loss, slurred speech, balance issues, chest pain, chest pressure, shortness of breath, difficulty breathing, increased thirst, increased urination, or other concerns  Contact information:  [address]  [place]  [id number]  [phone]                                Medication List        START taking these medications      azelastine 137 mcg (0.1 %) nasal spray  Commonly known as: ASTELIN  1 spray (137 mcg total) by Nasal route 2 (two) times daily.     fluticasone propionate 50 mcg/actuation nasal spray  Commonly known as: FLONASE  1 spray (50 mcg total) by Each Nostril route 2 (two) times daily as needed for Rhinitis or Allergies.     nicotine 21 mg/24 hr  Commonly known as: NICODERM CQ  Place 1 patch onto the skin once daily.            CHANGE how you take these medications      * cloNIDine 0.2 MG tablet  Commonly known as: CATAPRES  Take 1 tablet (0.2 mg total) by mouth 2 (two) times daily.  What changed: Another medication with the same name was added. Make sure you understand how and when to take each.     * cloNIDine 0.2 MG tablet  Commonly known as: CATAPRES  Take 1 tablet (0.2 mg total) by mouth 2 (two) times daily.  What changed: You were already taking a medication with the same name, and this prescription was added. Make sure you understand how and when to take each.     losartan 50 MG tablet  Commonly known as: COZAAR  Take 1 tablet (50 mg total) by mouth once daily.  What changed:   medication strength  how much to take           * This list has 2 medication(s) that are the same as other medications prescribed for you. Read the directions carefully, and ask your doctor or other care provider to review them with you.                ASK your doctor about these medications      acetaminophen 500 MG tablet  Commonly known as: TYLENOL     albuterol 90 mcg/actuation inhaler  Commonly known as: PROVENTIL/VENTOLIN HFA  Inhale 1-2 puffs into the lungs every 6 (six) hours as needed.     amitriptyline 50 MG tablet  Commonly known as: ELAVIL  Take 1 tablet (50 mg total) by mouth every evening.     BIKTARVY -25 mg (25 kg or greater)  Generic drug: bvmjxurrp-fzustjrj-tpqthjy ala  Biktarvy 50 mg-200 mg-25 mg tablet    "  blood sugar diagnostic Strp     cetirizine 10 MG tablet  Commonly known as: ZYRTEC  cetirizine 10 mg tablet     CLARITIN 10 mg tablet  Generic drug: loratadine     glimepiride 1 MG tablet  Commonly known as: AMARYL     LANTUS SOLOSTAR U-100 INSULIN 100 unit/mL (3 mL) Inpn pen  Generic drug: insulin glargine U-100 (Lantus)  Inject 40 Units into the skin every evening.     lisinopriL-hydrochlorothiazide 20-25 mg Tab  Commonly known as: PRINZIDE,ZESTORETIC     naproxen 500 MG tablet  Commonly known as: NAPROSYN  Take 1 tablet (500 mg total) by mouth 2 (two) times daily with meals.     NASAL SPRAY (OXYMETAZOLINE) 0.05 % nasal spray  Generic drug: oxymetazoline     ONETOUCH DELICA LANCETS 33 gauge Misc  Generic drug: lancets     ONETOUCH VERIO SYSTEM MISC     * pen needle, diabetic 31 gauge x 3/16" Ndle     * pen needle, diabetic 32 gauge x 5/32" Ndle  Use to inject insulin daily     VITAMIN D2 50,000 unit Cap  Generic drug: ergocalciferol           * This list has 2 medication(s) that are the same as other medications prescribed for you. Read the directions carefully, and ask your doctor or other care provider to review them with you.                   Where to Get Your Medications        You can get these medications from any pharmacy    Bring a paper prescription for each of these medications  azelastine 137 mcg (0.1 %) nasal spray  cloNIDine 0.2 MG tablet  fluticasone propionate 50 mcg/actuation nasal spray  losartan 50 MG tablet  nicotine 21 mg/24 hr         I discussed with patient and/or family/caretaker that evaluation in the ED does not suggest any emergent or life threatening medical conditions requiring immediate intervention beyond what was provided in the ED, and I believe patient is safe for discharge.  Regardless, an unremarkable evaluation in the ED does not preclude the development or presence of a serious of life threatening condition. As such, patient was instructed to return immediately for any " worsening or change in current symptoms.            Clinical Impression:  Final diagnoses:  [I10] Hypertension  [Z76.0] Encounter for medication refill (Primary)  [I10] Hypertension, poor control  [E87.6] Low blood potassium  [R73.9] Hyperglycemia  [Z72.0] Tobacco abuse  [D72.829] Leukocytosis, unspecified type  [D64.9] Anemia, unspecified type  [J30.2] Seasonal allergies                     [1]   Social History  Tobacco Use    Smoking status: Every Day     Current packs/day: 0.50     Types: Cigarettes    Smokeless tobacco: Never   Substance Use Topics    Alcohol use: No    Drug use: No        Radha Sanchez DO  02/22/25 2001

## 2025-02-23 LAB
HCV AB SERPL QL IA: POSITIVE
HEP C VIRUS HOLD SPECIMEN: NORMAL
HIV 1+2 AB+HIV1 P24 AG SERPL QL IA: POSITIVE
OHS QRS DURATION: 88 MS
OHS QTC CALCULATION: 489 MS

## 2025-02-26 LAB
HCV RNA SERPL QL NAA+PROBE: NOT DETECTED
HCV RNA SPEC NAA+PROBE-ACNC: NOT DETECTED IU/ML
HIV SUPPLEMENTAL ASSAY INTERPRETATION: ABNORMAL
HIV-1 RESULT: POSITIVE
HIV-2 RESULT: NEGATIVE

## 2025-07-12 ENCOUNTER — HOSPITAL ENCOUNTER (EMERGENCY)
Facility: HOSPITAL | Age: 50
Discharge: HOME | End: 2025-07-12
Attending: EMERGENCY MEDICINE

## 2025-07-12 VITALS
WEIGHT: 260.69 LBS | TEMPERATURE: 98 F | HEART RATE: 74 BPM | DIASTOLIC BLOOD PRESSURE: 91 MMHG | BODY MASS INDEX: 44.75 KG/M2 | OXYGEN SATURATION: 100 % | SYSTOLIC BLOOD PRESSURE: 192 MMHG | RESPIRATION RATE: 20 BRPM

## 2025-07-12 DIAGNOSIS — R51.9 HEADACHE: ICD-10-CM

## 2025-07-12 DIAGNOSIS — R07.9 CHEST PAIN: ICD-10-CM

## 2025-07-12 DIAGNOSIS — I10 PRIMARY HYPERTENSION: Primary | ICD-10-CM

## 2025-07-12 DIAGNOSIS — E87.6 HYPOKALEMIA: ICD-10-CM

## 2025-07-12 DIAGNOSIS — Z76.0 MEDICATION REFILL: ICD-10-CM

## 2025-07-12 LAB
ABSOLUTE EOSINOPHIL (OHS): 0.19 K/UL
ABSOLUTE MONOCYTE (OHS): 0.74 K/UL (ref 0.3–1)
ABSOLUTE NEUTROPHIL COUNT (OHS): 4.74 K/UL (ref 1.8–7.7)
ALBUMIN SERPL BCP-MCNC: 3.4 G/DL (ref 3.5–5.2)
ALP SERPL-CCNC: 117 UNIT/L (ref 40–150)
ALT SERPL W/O P-5'-P-CCNC: 13 UNIT/L (ref 10–44)
ANION GAP (OHS): 10 MMOL/L (ref 8–16)
AST SERPL-CCNC: 15 UNIT/L (ref 11–45)
BASOPHILS # BLD AUTO: 0.05 K/UL
BASOPHILS NFR BLD AUTO: 0.5 %
BILIRUB SERPL-MCNC: 0.3 MG/DL (ref 0.1–1)
BNP SERPL-MCNC: 62 PG/ML (ref 0–99)
BUN SERPL-MCNC: 16 MG/DL (ref 6–20)
CALCIUM SERPL-MCNC: 9 MG/DL (ref 8.7–10.5)
CHLORIDE SERPL-SCNC: 104 MMOL/L (ref 95–110)
CO2 SERPL-SCNC: 26 MMOL/L (ref 23–29)
CREAT SERPL-MCNC: 0.8 MG/DL (ref 0.5–1.4)
ERYTHROCYTE [DISTWIDTH] IN BLOOD BY AUTOMATED COUNT: 13.5 % (ref 11.5–14.5)
GFR SERPLBLD CREATININE-BSD FMLA CKD-EPI: >60 ML/MIN/1.73/M2
GLUCOSE SERPL-MCNC: 65 MG/DL (ref 70–110)
HCT VFR BLD AUTO: 34.6 % (ref 37–48.5)
HGB BLD-MCNC: 11.1 GM/DL (ref 12–16)
IMM GRANULOCYTES # BLD AUTO: 0.04 K/UL (ref 0–0.04)
IMM GRANULOCYTES NFR BLD AUTO: 0.4 % (ref 0–0.5)
LYMPHOCYTES # BLD AUTO: 3.34 K/UL (ref 1–4.8)
MAGNESIUM SERPL-MCNC: 1.7 MG/DL (ref 1.6–2.6)
MCH RBC QN AUTO: 31.1 PG (ref 27–31)
MCHC RBC AUTO-ENTMCNC: 32.1 G/DL (ref 32–36)
MCV RBC AUTO: 97 FL (ref 82–98)
NUCLEATED RBC (/100WBC) (OHS): 0 /100 WBC
PLATELET # BLD AUTO: 373 K/UL (ref 150–450)
PMV BLD AUTO: 9.1 FL (ref 9.2–12.9)
POTASSIUM SERPL-SCNC: 3.4 MMOL/L (ref 3.5–5.1)
PROT SERPL-MCNC: 7.9 GM/DL (ref 6–8.4)
RBC # BLD AUTO: 3.57 M/UL (ref 4–5.4)
RELATIVE EOSINOPHIL (OHS): 2.1 %
RELATIVE LYMPHOCYTE (OHS): 36.7 % (ref 18–48)
RELATIVE MONOCYTE (OHS): 8.1 % (ref 4–15)
RELATIVE NEUTROPHIL (OHS): 52.2 % (ref 38–73)
SODIUM SERPL-SCNC: 140 MMOL/L (ref 136–145)
TROPONIN I SERPL DL<=0.01 NG/ML-MCNC: 0.01 NG/ML
TROPONIN I SERPL DL<=0.01 NG/ML-MCNC: 0.03 NG/ML
WBC # BLD AUTO: 9.1 K/UL (ref 3.9–12.7)

## 2025-07-12 PROCEDURE — 63600175 PHARM REV CODE 636 W HCPCS: Mod: ER | Performed by: EMERGENCY MEDICINE

## 2025-07-12 PROCEDURE — 96375 TX/PRO/DX INJ NEW DRUG ADDON: CPT | Mod: ER

## 2025-07-12 PROCEDURE — 84484 ASSAY OF TROPONIN QUANT: CPT | Mod: ER | Performed by: EMERGENCY MEDICINE

## 2025-07-12 PROCEDURE — 96365 THER/PROPH/DIAG IV INF INIT: CPT | Mod: ER

## 2025-07-12 PROCEDURE — 93010 ELECTROCARDIOGRAM REPORT: CPT | Mod: ,,, | Performed by: INTERNAL MEDICINE

## 2025-07-12 PROCEDURE — 83880 ASSAY OF NATRIURETIC PEPTIDE: CPT | Mod: ER | Performed by: EMERGENCY MEDICINE

## 2025-07-12 PROCEDURE — 99285 EMERGENCY DEPT VISIT HI MDM: CPT | Mod: 25,ER

## 2025-07-12 PROCEDURE — 93005 ELECTROCARDIOGRAM TRACING: CPT | Mod: ER

## 2025-07-12 PROCEDURE — 83735 ASSAY OF MAGNESIUM: CPT | Mod: ER | Performed by: EMERGENCY MEDICINE

## 2025-07-12 PROCEDURE — 80053 COMPREHEN METABOLIC PANEL: CPT | Mod: ER | Performed by: EMERGENCY MEDICINE

## 2025-07-12 PROCEDURE — 25000003 PHARM REV CODE 250: Mod: ER | Performed by: EMERGENCY MEDICINE

## 2025-07-12 PROCEDURE — 96376 TX/PRO/DX INJ SAME DRUG ADON: CPT | Mod: ER

## 2025-07-12 PROCEDURE — 85025 COMPLETE CBC W/AUTO DIFF WBC: CPT | Mod: ER | Performed by: EMERGENCY MEDICINE

## 2025-07-12 RX ORDER — ACETAMINOPHEN 325 MG/1
650 TABLET ORAL
Status: COMPLETED | OUTPATIENT
Start: 2025-07-12 | End: 2025-07-12

## 2025-07-12 RX ORDER — LABETALOL HYDROCHLORIDE 5 MG/ML
40 INJECTION, SOLUTION INTRAVENOUS
Status: COMPLETED | OUTPATIENT
Start: 2025-07-12 | End: 2025-07-12

## 2025-07-12 RX ORDER — PEN NEEDLE, DIABETIC 30 GX3/16"
NEEDLE, DISPOSABLE MISCELLANEOUS
Qty: 100 EACH | Refills: 0 | Status: SHIPPED | OUTPATIENT
Start: 2025-07-12

## 2025-07-12 RX ORDER — CLONIDINE HYDROCHLORIDE 0.2 MG/1
0.2 TABLET ORAL 2 TIMES DAILY
Qty: 60 TABLET | Refills: 1 | Status: SHIPPED | OUTPATIENT
Start: 2025-07-12 | End: 2025-07-12

## 2025-07-12 RX ORDER — AMITRIPTYLINE HYDROCHLORIDE 50 MG/1
50 TABLET, FILM COATED ORAL NIGHTLY
Qty: 30 TABLET | Refills: 0 | Status: SHIPPED | OUTPATIENT
Start: 2025-07-12

## 2025-07-12 RX ORDER — CLONIDINE HYDROCHLORIDE 0.2 MG/1
0.2 TABLET ORAL 2 TIMES DAILY
Qty: 60 TABLET | Refills: 1 | Status: SHIPPED | OUTPATIENT
Start: 2025-07-12 | End: 2025-09-10

## 2025-07-12 RX ORDER — LABETALOL HYDROCHLORIDE 5 MG/ML
20 INJECTION, SOLUTION INTRAVENOUS
Status: COMPLETED | OUTPATIENT
Start: 2025-07-12 | End: 2025-07-12

## 2025-07-12 RX ORDER — GLIMEPIRIDE 1 MG/1
4 TABLET ORAL
Qty: 30 TABLET | Refills: 1 | Status: SHIPPED | OUTPATIENT
Start: 2025-07-12

## 2025-07-12 RX ORDER — MAGNESIUM SULFATE 1 G/100ML
1 INJECTION INTRAVENOUS ONCE
Status: COMPLETED | OUTPATIENT
Start: 2025-07-12 | End: 2025-07-12

## 2025-07-12 RX ORDER — CLONIDINE HYDROCHLORIDE 0.2 MG/1
0.2 TABLET ORAL
Status: COMPLETED | OUTPATIENT
Start: 2025-07-12 | End: 2025-07-12

## 2025-07-12 RX ORDER — POTASSIUM CHLORIDE 20 MEQ/1
40 TABLET, EXTENDED RELEASE ORAL
Status: COMPLETED | OUTPATIENT
Start: 2025-07-12 | End: 2025-07-12

## 2025-07-12 RX ADMIN — MAGNESIUM SULFATE IN DEXTROSE 1 G: 10 INJECTION, SOLUTION INTRAVENOUS at 07:07

## 2025-07-12 RX ADMIN — POTASSIUM CHLORIDE 40 MEQ: 1500 TABLET, EXTENDED RELEASE ORAL at 07:07

## 2025-07-12 RX ADMIN — LABETALOL HYDROCHLORIDE 40 MG: 5 INJECTION INTRAVENOUS at 07:07

## 2025-07-12 RX ADMIN — ACETAMINOPHEN 650 MG: 325 TABLET ORAL at 07:07

## 2025-07-12 RX ADMIN — LABETALOL HYDROCHLORIDE 20 MG: 5 INJECTION INTRAVENOUS at 06:07

## 2025-07-12 RX ADMIN — CLONIDINE HYDROCHLORIDE 0.2 MG: 0.2 TABLET ORAL at 05:07

## 2025-07-12 NOTE — ED PROVIDER NOTES
Encounter Date: 2025       History     Chief Complaint   Patient presents with    Hypertension     Out out of bp meds x 2 days. Headache     The history is provided by the patient.   Hypertension   This is a chronic problem. The current episode started today. The problem has been unchanged. Associated symptoms include malaise/fatigue and headaches. Pertinent negatives include no chest pain, no orthopnea, no palpitations, no PND, no anxiety, no confusion, no blurred vision, no tinnitus, no neck pain, no peripheral edema, no dizziness and no shortness of breath.     Review of patient's allergies indicates:   Allergen Reactions    Ibuprofen Swelling     Fills up with fluid    Morphine Itching    Naproxen     Penicillins     Toradol [ketorolac]      Past Medical History:   Diagnosis Date    Hepatitis C antibody positive in blood 10/13/2020    RNA NEG 2020    HIV (human immunodeficiency virus infection)     Hypertension      Past Surgical History:   Procedure Laterality Date    ANKLE FRACTURE SURGERY Left      SECTION      FRACTURE SURGERY      HYSTERECTOMY       No family history on file.  Social History[1]  Review of Systems   Constitutional:  Positive for malaise/fatigue. Negative for fever.   HENT:  Negative for sore throat and tinnitus.    Eyes:  Negative for blurred vision.   Respiratory:  Negative for shortness of breath.    Cardiovascular:  Negative for chest pain, palpitations, orthopnea and PND.   Gastrointestinal:  Negative for nausea.   Genitourinary:  Negative for dysuria.   Musculoskeletal:  Negative for back pain and neck pain.   Skin:  Negative for rash.   Neurological:  Positive for headaches. Negative for dizziness and weakness.   Hematological:  Does not bruise/bleed easily.   Psychiatric/Behavioral:  Negative for confusion.        Physical Exam     Initial Vitals [25 1653]   BP Pulse Resp Temp SpO2   (!) 238/124 97 18 98.2 °F (36.8 °C) 98 %      MAP       --         Physical  Exam    Nursing note and vitals reviewed.  Constitutional: She appears well-developed and well-nourished. No distress.   HENT:   Head: Normocephalic and atraumatic. Mouth/Throat: Oropharynx is clear and moist.   Eyes: Conjunctivae and EOM are normal. Pupils are equal, round, and reactive to light.   Neck: Neck supple.   Normal range of motion.  Cardiovascular:  Normal rate, regular rhythm and normal heart sounds.           Pulmonary/Chest: Breath sounds normal. No respiratory distress.   Abdominal: Abdomen is soft. Bowel sounds are normal. She exhibits no distension. There is no abdominal tenderness.   Musculoskeletal:         General: Normal range of motion.      Cervical back: Normal range of motion and neck supple.     Neurological: She is alert and oriented to person, place, and time. She has normal strength.   Skin: Skin is warm and dry.   Psychiatric: She has a normal mood and affect. Thought content normal.         ED Course   Procedures  Labs Reviewed   COMPREHENSIVE METABOLIC PANEL - Abnormal       Result Value    Sodium 140      Potassium 3.4 (*)     Chloride 104      CO2 26      Glucose 65 (*)     BUN 16      Creatinine 0.8      Calcium 9.0      Protein Total 7.9      Albumin 3.4 (*)     Bilirubin Total 0.3            AST 15      ALT 13      Anion Gap 10      eGFR >60     CBC WITH DIFFERENTIAL - Abnormal    WBC 9.10      RBC 3.57 (*)     HGB 11.1 (*)     HCT 34.6 (*)     MCV 97      MCH 31.1 (*)     MCHC 32.1      RDW 13.5      Platelet Count 373      MPV 9.1 (*)     Nucleated RBC 0      Neut % 52.2      Lymph % 36.7      Mono % 8.1      Eos % 2.1      Basophil % 0.5      Imm Grans % 0.4      Neut # 4.74      Lymph # 3.34      Mono # 0.74      Eos # 0.19      Baso # 0.05      Imm Grans # 0.04     TROPONIN I - Normal    Troponin-I 0.026     B-TYPE NATRIURETIC PEPTIDE - Normal    BNP 62     MAGNESIUM - Normal    Magnesium  1.7     TROPONIN I - Normal    Troponin-I 0.012     CBC W/ AUTO DIFFERENTIAL     Narrative:     The following orders were created for panel order CBC auto differential.  Procedure                               Abnormality         Status                     ---------                               -----------         ------                     CBC with Differential[[id number]]       Abnormal            Final result                 Please view results for these tests on the individual orders.     EKG Readings: (Independently Interpreted)   Initial Reading: No STEMI. Rhythm: Normal Sinus Rhythm. Heart Rate: 91. Ectopy: No Ectopy. Conduction: Normal. ST Segments: Normal ST Segments. T Waves: Normal. Axis: Normal. Clinical Impression: Normal Sinus Rhythm   Prolonged .       Imaging Results              CT Head Without Contrast (Final result)  Result time 07/12/25 18:29:29      Final result by Jus Dodd MD (07/12/25 18:29:29)                   Impression:      1. No acute process seen.  Recommend follow-up if symptoms persist      All CT scans at [this location] are performed using dose modulation techniques as appropriate to a performed exam including the following: automated exposure control; adjustment of the mA and/or kV according to patient size (this includes techniques or standardized protocols for targeted exams where dose is matched to indication / reason for exam; i.e. extremities or head); use of iterative reconstruction technique.      Finalized on: 7/12/2025 6:29 PM By:  Jus Dodd MD PAIGE PhD  [facility]# [id number]      2025-07-12 18:31:36.339     [facility]               Narrative:    EXAM: CT HEAD WITHOUT CONTRAST    CLINICAL HISTORY: Pain    TECHNIQUE: Contiguous axial images were obtained from the skull base through the vertex without intravenous contrast.    COMPARISON: None available.    FINDINGS: No intracranial hemorrhage mass effect or midline shift.   No extra axial fluid collections.    The ventricles and sulci are unremarkable for age.  There is no evidence of  hydrocephalus.    The paranasal sinuses and mastoid air cells are clear.  No fractures are identified.  No concerning osseous lesions.                                         X-Ray Chest AP Portable (Final result)  Result time 07/12/25 18:32:59      Final result by Jus Dodd MD (07/12/25 18:32:59)                   Impression:     No acute process.    Finalized on: 7/12/2025 6:32 PM By:  Jus Dodd MD PAIGE PhD  [facility]# [id number]      2025-07-12 18:35:06.533     [facility]               Narrative:    EXAM: XR CHEST AP PORTABLE    CLINICAL HISTORY: Pain    FINDINGS:  Lungs are clear.  Cardiac silhouette within normal. No acute osseous injury.                                         Medications   labetaloL injection 20 mg (20 mg Intravenous Given 7/12/25 1804)   cloNIDine tablet 0.2 mg (0.2 mg Oral Given 7/12/25 1753)   potassium chloride SA CR tablet 40 mEq (40 mEq Oral Given 7/12/25 1913)   acetaminophen tablet 650 mg (650 mg Oral Given 7/12/25 1913)   magnesium sulfate in dextrose IVPB (premix) 1 g (0 g Intravenous Stopped 7/12/25 1957)   labetaloL injection 40 mg (40 mg Intravenous Given 7/12/25 1955)     Medical Decision Making  7:13 PM: Patient assessed by Dr. Garcia.  Symptoms have improved and headache has resolved with improving blood pressure.  Noncontrast head CT is unremarkable.  She has no signs of end-organ dysfunction on chest x-ray, BNP, CMP, EKG, or troponin x2 concerning for hypertensive emergency.  Appropriate refills provided.  Discussed importance of compliance to prevent stroke, heart attack, and kidney failure that may lead to dialysis, death, or permanent disability.      Amount and/or Complexity of Data Reviewed  Labs: ordered.  Radiology: ordered.    Risk  OTC drugs.  Prescription drug management.      Additional MDM:   Differential Diagnosis:   Hypertension, AVA, ACS, CHF, intracranial hemorrhage, tension headache, migraine headache, DKA/HHS.                                         Clinical  "Impression:  Final diagnoses:  [R07.9] Chest pain  [R51.9] Headache  [E87.6] Hypokalemia  [I10] Primary hypertension (Primary)  [Z76.0] Medication refill          ED Disposition Condition    Discharge           ED Prescriptions       Medication Sig Dispense Start Date End Date Auth. Provider    amitriptyline (ELAVIL) 50 MG tablet Take 1 tablet (50 mg total) by mouth every evening. 30 tablet 7/12/2025 -- Sherri Garcia DO    cloNIDine (CATAPRES) 0.2 MG tablet  (Status: Discontinued) Take 1 tablet (0.2 mg total) by mouth 2 (two) times daily. 60 tablet 7/12/2025 7/12/2025 Sherri Garcia DO    glimepiride (AMARYL) 1 MG tablet Take 4 tablets (4 mg total) by mouth daily with breakfast. 30 tablet 7/12/2025 -- Sherri Garcia DO    pen needle, diabetic 32 gauge x 5/32" Ndle Use to inject insulin daily 100 each 7/12/2025 -- Sherri Garcia DO    cloNIDine (CATAPRES) 0.2 MG tablet Take 1 tablet (0.2 mg total) by mouth 2 (two) times daily. 60 tablet 7/12/2025 9/10/2025 Sherri Garcia DO          Follow-up Information       Follow up With Specialties Details Why Contact Info    Melina Lorenzana MD Infectious Diseases, Internal Medicine On 7/14/2025  [address] [facility] [address] [phone]      [facility] - Emergency Dept Emergency Medicine  As needed, If symptoms worsen [address]  Emergency Department  [facility] [id number] [phone]                   [1]   Social History  Tobacco Use    Smoking status: Every Day     Current packs/day: 0.50     Types: Cigarettes    Smokeless tobacco: Never   Substance Use Topics    Alcohol use: No    Drug use: No        Sherri Garcia DO  07/13/25 0256    "

## 2025-07-13 LAB
OHS QRS DURATION: 82 MS
OHS QTC CALCULATION: 494 MS